# Patient Record
Sex: MALE | Race: WHITE | NOT HISPANIC OR LATINO | URBAN - METROPOLITAN AREA
[De-identification: names, ages, dates, MRNs, and addresses within clinical notes are randomized per-mention and may not be internally consistent; named-entity substitution may affect disease eponyms.]

---

## 2018-05-17 ENCOUNTER — EMERGENCY (EMERGENCY)
Facility: HOSPITAL | Age: 43
LOS: 0 days | Discharge: HOME | End: 2018-05-17
Attending: EMERGENCY MEDICINE | Admitting: EMERGENCY MEDICINE
Payer: COMMERCIAL

## 2018-05-17 VITALS
SYSTOLIC BLOOD PRESSURE: 148 MMHG | WEIGHT: 195.11 LBS | DIASTOLIC BLOOD PRESSURE: 95 MMHG | RESPIRATION RATE: 18 BRPM | TEMPERATURE: 98 F | OXYGEN SATURATION: 97 % | HEART RATE: 99 BPM

## 2018-05-17 VITALS
SYSTOLIC BLOOD PRESSURE: 125 MMHG | OXYGEN SATURATION: 97 % | TEMPERATURE: 98 F | HEART RATE: 89 BPM | DIASTOLIC BLOOD PRESSURE: 72 MMHG | RESPIRATION RATE: 18 BRPM

## 2018-05-17 LAB
ALBUMIN SERPL ELPH-MCNC: 3.9 G/DL — SIGNIFICANT CHANGE UP (ref 3.3–5)
ALP SERPL-CCNC: 97 U/L — SIGNIFICANT CHANGE UP (ref 40–120)
ALT FLD-CCNC: 44 U/L — SIGNIFICANT CHANGE UP (ref 12–78)
ANION GAP SERPL CALC-SCNC: 7 MMOL/L — SIGNIFICANT CHANGE UP (ref 5–17)
AST SERPL-CCNC: 31 U/L — SIGNIFICANT CHANGE UP (ref 15–37)
BASOPHILS # BLD AUTO: 0.03 K/UL — SIGNIFICANT CHANGE UP (ref 0–0.2)
BASOPHILS NFR BLD AUTO: 0.4 % — SIGNIFICANT CHANGE UP (ref 0–2)
BILIRUB SERPL-MCNC: 0.3 MG/DL — SIGNIFICANT CHANGE UP (ref 0.2–1.2)
BUN SERPL-MCNC: 14 MG/DL — SIGNIFICANT CHANGE UP (ref 7–23)
CALCIUM SERPL-MCNC: 8.6 MG/DL — SIGNIFICANT CHANGE UP (ref 8.5–10.1)
CHLORIDE SERPL-SCNC: 108 MMOL/L — SIGNIFICANT CHANGE UP (ref 96–108)
CO2 SERPL-SCNC: 27 MMOL/L — SIGNIFICANT CHANGE UP (ref 22–31)
CREAT SERPL-MCNC: 0.92 MG/DL — SIGNIFICANT CHANGE UP (ref 0.5–1.3)
EOSINOPHIL # BLD AUTO: 0.16 K/UL — SIGNIFICANT CHANGE UP (ref 0–0.5)
EOSINOPHIL NFR BLD AUTO: 2.1 % — SIGNIFICANT CHANGE UP (ref 0–6)
GLUCOSE SERPL-MCNC: 122 MG/DL — HIGH (ref 70–99)
HCT VFR BLD CALC: 41.8 % — SIGNIFICANT CHANGE UP (ref 39–50)
HGB BLD-MCNC: 15 G/DL — SIGNIFICANT CHANGE UP (ref 13–17)
IMM GRANULOCYTES NFR BLD AUTO: 0.3 % — SIGNIFICANT CHANGE UP (ref 0–1.5)
LYMPHOCYTES # BLD AUTO: 1.44 K/UL — SIGNIFICANT CHANGE UP (ref 1–3.3)
LYMPHOCYTES # BLD AUTO: 19.1 % — SIGNIFICANT CHANGE UP (ref 13–44)
MAGNESIUM SERPL-MCNC: 2 MG/DL — SIGNIFICANT CHANGE UP (ref 1.6–2.6)
MCHC RBC-ENTMCNC: 31 PG — SIGNIFICANT CHANGE UP (ref 27–34)
MCHC RBC-ENTMCNC: 35.9 GM/DL — SIGNIFICANT CHANGE UP (ref 32–36)
MCV RBC AUTO: 86.4 FL — SIGNIFICANT CHANGE UP (ref 80–100)
MONOCYTES # BLD AUTO: 0.52 K/UL — SIGNIFICANT CHANGE UP (ref 0–0.9)
MONOCYTES NFR BLD AUTO: 6.9 % — SIGNIFICANT CHANGE UP (ref 2–14)
NEUTROPHILS # BLD AUTO: 5.38 K/UL — SIGNIFICANT CHANGE UP (ref 1.8–7.4)
NEUTROPHILS NFR BLD AUTO: 71.2 % — SIGNIFICANT CHANGE UP (ref 43–77)
NRBC # BLD: 0 /100 WBCS — SIGNIFICANT CHANGE UP (ref 0–0)
PHOSPHATE SERPL-MCNC: 2.1 MG/DL — LOW (ref 2.5–4.5)
PLATELET # BLD AUTO: 230 K/UL — SIGNIFICANT CHANGE UP (ref 150–400)
POTASSIUM SERPL-MCNC: 4 MMOL/L — SIGNIFICANT CHANGE UP (ref 3.5–5.3)
POTASSIUM SERPL-SCNC: 4 MMOL/L — SIGNIFICANT CHANGE UP (ref 3.5–5.3)
PROT SERPL-MCNC: 7.6 GM/DL — SIGNIFICANT CHANGE UP (ref 6–8.3)
RBC # BLD: 4.84 M/UL — SIGNIFICANT CHANGE UP (ref 4.2–5.8)
RBC # FLD: 11.8 % — SIGNIFICANT CHANGE UP (ref 10.3–14.5)
SODIUM SERPL-SCNC: 142 MMOL/L — SIGNIFICANT CHANGE UP (ref 135–145)
TROPONIN I SERPL-MCNC: <0.015 NG/ML — SIGNIFICANT CHANGE UP (ref 0.01–0.04)
WBC # BLD: 7.55 K/UL — SIGNIFICANT CHANGE UP (ref 3.8–10.5)
WBC # FLD AUTO: 7.55 K/UL — SIGNIFICANT CHANGE UP (ref 3.8–10.5)

## 2018-05-17 PROCEDURE — 93010 ELECTROCARDIOGRAM REPORT: CPT

## 2018-05-17 PROCEDURE — 99284 EMERGENCY DEPT VISIT MOD MDM: CPT

## 2018-05-17 PROCEDURE — 70450 CT HEAD/BRAIN W/O DYE: CPT | Mod: 26

## 2018-05-17 RX ORDER — SODIUM,POTASSIUM PHOSPHATES 278-250MG
1 POWDER IN PACKET (EA) ORAL ONCE
Qty: 0 | Refills: 0 | Status: COMPLETED | OUTPATIENT
Start: 2018-05-17 | End: 2018-05-17

## 2018-05-17 RX ADMIN — Medication 1 PACKET(S): at 21:38

## 2018-05-17 NOTE — ED PROVIDER NOTE - OBJECTIVE STATEMENT
42 yo M no significant PMHx presents with CC paresthesias.  Symptoms began yesterday.  C/o migrating, transient paresthesias of the left jaw, left arm, sometimes legs.  Did have some SOB, palpitations, feeling of anxiety as well today, which brought him to the ED.  Denies headaches, slurred speech, weakness, or any other symptoms.  Denies previous occurrence.  No other concerns.

## 2018-05-17 NOTE — ED ADULT TRIAGE NOTE - CHIEF COMPLAINT QUOTE
pt presents to ED due to left sided numbness and tingling x 3 days , pt states today he had difficulty with speech and numbness down left sided jaw and left arm now resolved NO CODE STROKE AS PER MD ROBERTS

## 2018-05-17 NOTE — ED PROVIDER NOTE - MEDICAL DECISION MAKING DETAILS
No neuro deficits on exam.  Symptoms not consistent with stroke.  Pt appears well, WNL.  CT head WNL.  Labs WNl with exception of low phos.  Asymptomatic on reeval.  Given phosphorus replacement in ED.  Okay for d/c home, f/u with PCP.

## 2018-05-17 NOTE — ED ADULT NURSE REASSESSMENT NOTE - NS ED NURSE REASSESS COMMENT FT1
Care of pt received from Sosa Matt RN, pt ambulated self to bathroom without difficulty. Pt taken to and returned from CT. Awaiting  radiology and laboratory results at this time. Will continue to monitor.

## 2018-05-18 DIAGNOSIS — E83.39 OTHER DISORDERS OF PHOSPHORUS METABOLISM: ICD-10-CM

## 2018-05-18 DIAGNOSIS — R20.2 PARESTHESIA OF SKIN: ICD-10-CM

## 2018-05-18 DIAGNOSIS — R20.0 ANESTHESIA OF SKIN: ICD-10-CM

## 2019-11-27 PROBLEM — Z00.00 ENCOUNTER FOR PREVENTIVE HEALTH EXAMINATION: Status: ACTIVE | Noted: 2019-11-27

## 2019-12-04 ENCOUNTER — APPOINTMENT (OUTPATIENT)
Dept: GASTROENTEROLOGY | Facility: CLINIC | Age: 44
End: 2019-12-04
Payer: MEDICAID

## 2019-12-04 VITALS
SYSTOLIC BLOOD PRESSURE: 130 MMHG | WEIGHT: 195 LBS | HEART RATE: 86 BPM | HEIGHT: 71 IN | BODY MASS INDEX: 27.3 KG/M2 | DIASTOLIC BLOOD PRESSURE: 79 MMHG

## 2019-12-04 DIAGNOSIS — F41.9 ANXIETY DISORDER, UNSPECIFIED: ICD-10-CM

## 2019-12-04 DIAGNOSIS — Z78.9 OTHER SPECIFIED HEALTH STATUS: ICD-10-CM

## 2019-12-04 DIAGNOSIS — J34.9 UNSPECIFIED DISORDER OF NOSE AND NASAL SINUSES: ICD-10-CM

## 2019-12-04 DIAGNOSIS — R19.7 DIARRHEA, UNSPECIFIED: ICD-10-CM

## 2019-12-04 DIAGNOSIS — Z80.9 FAMILY HISTORY OF MALIGNANT NEOPLASM, UNSPECIFIED: ICD-10-CM

## 2019-12-04 DIAGNOSIS — R14.3 FLATULENCE: ICD-10-CM

## 2019-12-04 DIAGNOSIS — R06.2 WHEEZING: ICD-10-CM

## 2019-12-04 DIAGNOSIS — F43.9 REACTION TO SEVERE STRESS, UNSPECIFIED: ICD-10-CM

## 2019-12-04 DIAGNOSIS — R05 COUGH: ICD-10-CM

## 2019-12-04 DIAGNOSIS — Z87.898 PERSONAL HISTORY OF OTHER SPECIFIED CONDITIONS: ICD-10-CM

## 2019-12-04 DIAGNOSIS — R10.84 GENERALIZED ABDOMINAL PAIN: ICD-10-CM

## 2019-12-04 PROCEDURE — 99204 OFFICE O/P NEW MOD 45 MIN: CPT

## 2019-12-05 PROBLEM — Z80.9 FAMILY HISTORY OF MALIGNANT NEOPLASM: Status: ACTIVE | Noted: 2019-12-04

## 2019-12-05 PROBLEM — R05 COUGH: Status: ACTIVE | Noted: 2019-12-04

## 2019-12-05 PROBLEM — R10.84 GENERALIZED ABDOMINAL PAIN: Status: ACTIVE | Noted: 2019-12-04

## 2019-12-05 PROBLEM — R14.3 EXCESSIVE GAS: Status: ACTIVE | Noted: 2019-12-04

## 2019-12-05 PROBLEM — Z87.898 HISTORY OF SHORTNESS OF BREATH: Status: ACTIVE | Noted: 2019-12-04

## 2019-12-05 PROBLEM — Z78.9 CAFFEINE USE: Status: ACTIVE | Noted: 2019-12-04

## 2019-12-05 PROBLEM — J34.9 SINUS PROBLEM: Status: ACTIVE | Noted: 2019-12-04

## 2019-12-05 PROBLEM — F43.9 STRESS: Status: ACTIVE | Noted: 2019-12-04

## 2019-12-05 PROBLEM — F41.9 ANXIETY: Status: ACTIVE | Noted: 2019-12-04

## 2019-12-05 PROBLEM — R19.7 DIARRHEA, UNSPECIFIED TYPE: Status: ACTIVE | Noted: 2019-12-04

## 2019-12-05 PROBLEM — R06.2 WHEEZING: Status: ACTIVE | Noted: 2019-12-04

## 2019-12-05 PROBLEM — Z78.9 SOCIAL ALCOHOL USE: Status: ACTIVE | Noted: 2019-12-04

## 2019-12-05 NOTE — PHYSICAL EXAM
[General Appearance - Alert] : alert [General Appearance - In No Acute Distress] : in no acute distress [PERRL With Normal Accommodation] : pupils were equal in size, round, and reactive to light [Sclera] : the sclera and conjunctiva were normal [Extraocular Movements] : extraocular movements were intact [Outer Ear] : the ears and nose were normal in appearance [Oropharynx] : the oropharynx was normal [Neck Appearance] : the appearance of the neck was normal [Neck Cervical Mass (___cm)] : no neck mass was observed [Jugular Venous Distention Increased] : there was no jugular-venous distention [Thyroid Diffuse Enlargement] : the thyroid was not enlarged [Thyroid Nodule] : there were no palpable thyroid nodules [Auscultation Breath Sounds / Voice Sounds] : lungs were clear to auscultation bilaterally [Heart Rate And Rhythm] : heart rate was normal and rhythm regular [Heart Sounds] : normal S1 and S2 [Heart Sounds Gallop] : no gallops [Heart Sounds Pericardial Friction Rub] : no pericardial rub [Murmurs] : no murmurs [Abdomen Soft] : soft [Bowel Sounds] : normal bowel sounds [Abdomen Tenderness] : non-tender [Abdomen Mass (___ Cm)] : no abdominal mass palpated [Cervical Lymph Nodes Enlarged Posterior Bilaterally] : posterior cervical [Cervical Lymph Nodes Enlarged Anterior Bilaterally] : anterior cervical [Supraclavicular Lymph Nodes Enlarged Bilaterally] : supraclavicular [No CVA Tenderness] : no ~M costovertebral angle tenderness [No Spinal Tenderness] : no spinal tenderness [Nail Clubbing] : no clubbing  or cyanosis of the fingernails [Abnormal Walk] : normal gait [Musculoskeletal - Swelling] : no joint swelling seen [Motor Tone] : muscle strength and tone were normal [Skin Color & Pigmentation] : normal skin color and pigmentation [] : no rash [Skin Turgor] : normal skin turgor [Oriented To Time, Place, And Person] : oriented to person, place, and time [No Focal Deficits] : no focal deficits [Impaired Insight] : insight and judgment were intact [Affect] : the affect was normal

## 2019-12-24 NOTE — HISTORY OF PRESENT ILLNESS
[de-identified] : The patient arrived for consultation visit. Patient has history of severe reflux in the past. He had an EGD performed about 5 years ago. He was on omeprazole and then by changing his diet his reflux is under control. He is not taking any medications for that. He also underwent a colonoscopy at that time which was unremarkable. Lately he has been complaining of change in bowel habits and has to pass bowel movements more frequently. He does not notice any bleeding. His father passed away due to undiagnosed metastatic cancer. There was a concern whether he had colon cancer. The patient is eating heavily and has intentional weight loss. No recent labs.

## 2019-12-24 NOTE — ASSESSMENT
[FreeTextEntry1] : I am recommending to obtain the records after last EGD and colonoscopy. I'm recommending to drink more water and start fiber supplementation. Due to concern of Metastatic colon cancer in his father, we can consider performing colonoscopy. The bowel preparation was discussed at length. Risks (including bleeding, pain, perforation, incomplete examination, splenic laceration, adverse reactions to medications, aspiration and death), benefits and alternatives were discussed. Patient is agreeable for the colonoscopy. The patient is medically optimized for the procedure. We will schedule the patient for the procedure. Bowel preparation was sent to the pharmacy.\par \par Hima Bazzi MD\par Gastroenterology \par \par

## 2020-01-03 ENCOUNTER — APPOINTMENT (OUTPATIENT)
Dept: GASTROENTEROLOGY | Facility: GI CENTER | Age: 45
End: 2020-01-03
Payer: MEDICAID

## 2020-01-03 ENCOUNTER — OUTPATIENT (OUTPATIENT)
Dept: OUTPATIENT SERVICES | Facility: HOSPITAL | Age: 45
LOS: 1 days | End: 2020-01-03
Payer: COMMERCIAL

## 2020-01-03 ENCOUNTER — RESULT REVIEW (OUTPATIENT)
Age: 45
End: 2020-01-03

## 2020-01-03 DIAGNOSIS — R10.9 UNSPECIFIED ABDOMINAL PAIN: ICD-10-CM

## 2020-01-03 DIAGNOSIS — R14.0 ABDOMINAL DISTENSION (GASEOUS): ICD-10-CM

## 2020-01-03 PROCEDURE — 88305 TISSUE EXAM BY PATHOLOGIST: CPT | Mod: 26

## 2020-01-03 PROCEDURE — 45380 COLONOSCOPY AND BIOPSY: CPT

## 2020-01-03 PROCEDURE — 88305 TISSUE EXAM BY PATHOLOGIST: CPT

## 2020-01-03 RX ORDER — POLYETHYLENE GLYOCOL 3350, SODIUM CHLORIDE, SODIUM BICARBONATE AND POTASSIUM CHLORIDE 420; 11.2; 5.72; 1.48 G/4L; G/4L; G/4L; G/4L
420 POWDER, FOR SOLUTION NASOGASTRIC; ORAL
Qty: 1 | Refills: 0 | Status: COMPLETED | COMMUNITY
Start: 2019-12-04 | End: 2020-01-03

## 2020-01-03 NOTE — ASSESSMENT
[FreeTextEntry1] : IMPRESSION:\par Diminutive colon polyp\par Internal hemorrhoids\par \par RECOMMENDATIONS:\par Repeat colonoscopy in 5 years

## 2020-01-03 NOTE — PHYSICAL EXAM
[General Appearance - Alert] : alert [General Appearance - In No Acute Distress] : in no acute distress [Sclera] : the sclera and conjunctiva were normal [PERRL With Normal Accommodation] : pupils were equal in size, round, and reactive to light [Outer Ear] : the ears and nose were normal in appearance [Oropharynx] : the oropharynx was normal [Extraocular Movements] : extraocular movements were intact [Neck Cervical Mass (___cm)] : no neck mass was observed [Neck Appearance] : the appearance of the neck was normal [Jugular Venous Distention Increased] : there was no jugular-venous distention [Thyroid Diffuse Enlargement] : the thyroid was not enlarged [Thyroid Nodule] : there were no palpable thyroid nodules [Auscultation Breath Sounds / Voice Sounds] : lungs were clear to auscultation bilaterally [Heart Sounds] : normal S1 and S2 [Heart Sounds Gallop] : no gallops [Heart Rate And Rhythm] : heart rate was normal and rhythm regular [Murmurs] : no murmurs [Heart Sounds Pericardial Friction Rub] : no pericardial rub [Bowel Sounds] : normal bowel sounds [Abdomen Soft] : soft [Abdomen Tenderness] : non-tender [Abdomen Mass (___ Cm)] : no abdominal mass palpated [Cervical Lymph Nodes Enlarged Posterior Bilaterally] : posterior cervical [Cervical Lymph Nodes Enlarged Anterior Bilaterally] : anterior cervical [No CVA Tenderness] : no ~M costovertebral angle tenderness [Supraclavicular Lymph Nodes Enlarged Bilaterally] : supraclavicular [No Spinal Tenderness] : no spinal tenderness [Nail Clubbing] : no clubbing  or cyanosis of the fingernails [Abnormal Walk] : normal gait [Musculoskeletal - Swelling] : no joint swelling seen [Skin Color & Pigmentation] : normal skin color and pigmentation [Motor Tone] : muscle strength and tone were normal [] : no rash [No Focal Deficits] : no focal deficits [Skin Turgor] : normal skin turgor [Oriented To Time, Place, And Person] : oriented to person, place, and time [Impaired Insight] : insight and judgment were intact [Affect] : the affect was normal

## 2020-01-03 NOTE — PROCEDURE
[With Biopsy] : with biopsy [With Polypectomy] : polypectomy [Allergies Reviewed] : allergies reviewed. [Procedure Explained] : The procedure was explained [Abdominal Pain] : abdominal pain [Benefits] : benefits [Risks] : Risks [Alternatives] : alternatives [Consent Obtained] : written consent was obtained prior to the procedure and is detailed in the patient's record [Bowel Prep Kit] : the patient took the appropriate bowel preparation kit as directed [Patient] : the patient [Approved Diet Followed] : the patient avoided solid foods and adhered to the approved diet list for 24 hours prior to the procedure [Automated Blood Pressure Cuff] : automated blood pressure cuff [Cardiac Monitor] : cardiac monitor [Pulse Oximeter] : pulse oximeter [Propofol ___ mg IV] : Propofol [unfilled] ~Umg intravenously [2] : 2 [Sedation Clearance] : the patient was cleared for moderate sedation [Time started: ___] : Start Time:  [unfilled] [Withdrawal Time: ___] : Withdrawal Time:  [unfilled] [Prep Qualtiy: ___] : Prep Quality:  [unfilled] [Time Completed: ___] : Completion Time:  [unfilled] [Left Lateral Decubitus] : The patient was positioned in the left lateral decubitus position [Performed By: ___] : Performed by:  KRIS [Normal Prostate] : a normal prostate [Cecum (Landmarks)] : and guided to the cecum which was identified by the anatomic landmarks of the appendiceal orifice and ileocecal valve [Terminal Ileum via Ileocecal Valve] : and the terminal ileum was examined by entering the ileocecal valve [Single Pass Needed] : after a single pass [No Difficulty] : without difficulty [Insufflated] : insufflated [Retroflex View] : a retroflex view of the rectum was performed [Polyps] : polyps [Biopsy] : biopsy [Hemorrhoids] : hemorrhoids [Normal] : Normal [Sent to Pathology] : was sent to pathology for analysis [No Complications] : There were no complications [Vital Signs Stable] : the vital signs were stable [Tolerated Well] : the patient tolerated the procedure well [Abnormal Rectum] : a normal rectum [External Hemorrhoids] : no external hemorrhoids [Patient Rotated Into Alternating Positions] : the patient was not rotated [FreeTextEntry2] : [id number] [de-identified] : Normal terminal ileum [de-identified] : 3 mm polyp s/p cold biopsy [de-identified] : Transverse colon polyp

## 2020-01-03 NOTE — PHYSICAL EXAM
[General Appearance - In No Acute Distress] : in no acute distress [General Appearance - Alert] : alert [Sclera] : the sclera and conjunctiva were normal [PERRL With Normal Accommodation] : pupils were equal in size, round, and reactive to light [Extraocular Movements] : extraocular movements were intact [Oropharynx] : the oropharynx was normal [Outer Ear] : the ears and nose were normal in appearance [Neck Appearance] : the appearance of the neck was normal [Jugular Venous Distention Increased] : there was no jugular-venous distention [Neck Cervical Mass (___cm)] : no neck mass was observed [Thyroid Diffuse Enlargement] : the thyroid was not enlarged [Thyroid Nodule] : there were no palpable thyroid nodules [Auscultation Breath Sounds / Voice Sounds] : lungs were clear to auscultation bilaterally [Heart Rate And Rhythm] : heart rate was normal and rhythm regular [Heart Sounds] : normal S1 and S2 [Heart Sounds Gallop] : no gallops [Murmurs] : no murmurs [Heart Sounds Pericardial Friction Rub] : no pericardial rub [Bowel Sounds] : normal bowel sounds [Abdomen Tenderness] : non-tender [Abdomen Soft] : soft [Abdomen Mass (___ Cm)] : no abdominal mass palpated [Cervical Lymph Nodes Enlarged Posterior Bilaterally] : posterior cervical [Cervical Lymph Nodes Enlarged Anterior Bilaterally] : anterior cervical [No CVA Tenderness] : no ~M costovertebral angle tenderness [Supraclavicular Lymph Nodes Enlarged Bilaterally] : supraclavicular [No Spinal Tenderness] : no spinal tenderness [Abnormal Walk] : normal gait [Nail Clubbing] : no clubbing  or cyanosis of the fingernails [Musculoskeletal - Swelling] : no joint swelling seen [Skin Color & Pigmentation] : normal skin color and pigmentation [Motor Tone] : muscle strength and tone were normal [] : no rash [Skin Turgor] : normal skin turgor [No Focal Deficits] : no focal deficits [Impaired Insight] : insight and judgment were intact [Oriented To Time, Place, And Person] : oriented to person, place, and time [Affect] : the affect was normal

## 2020-01-03 NOTE — PROCEDURE
[With Biopsy] : with biopsy [With Polypectomy] : polypectomy [Allergies Reviewed] : allergies reviewed. [Abdominal Pain] : abdominal pain [Procedure Explained] : The procedure was explained [Benefits] : benefits [Risks] : Risks [Consent Obtained] : written consent was obtained prior to the procedure and is detailed in the patient's record [Alternatives] : alternatives [Patient] : the patient [Bowel Prep Kit] : the patient took the appropriate bowel preparation kit as directed [Automated Blood Pressure Cuff] : automated blood pressure cuff [Approved Diet Followed] : the patient avoided solid foods and adhered to the approved diet list for 24 hours prior to the procedure [Pulse Oximeter] : pulse oximeter [Propofol ___ mg IV] : Propofol [unfilled] ~Umg intravenously [Cardiac Monitor] : cardiac monitor [2] : 2 [Time started: ___] : Start Time:  [unfilled] [Sedation Clearance] : the patient was cleared for moderate sedation [Withdrawal Time: ___] : Withdrawal Time:  [unfilled] [Prep Qualtiy: ___] : Prep Quality:  [unfilled] [Left Lateral Decubitus] : The patient was positioned in the left lateral decubitus position [Performed By: ___] : Performed by:  KRIS [Time Completed: ___] : Completion Time:  [unfilled] [Normal Prostate] : a normal prostate [Terminal Ileum via Ileocecal Valve] : and the terminal ileum was examined by entering the ileocecal valve [Cecum (Landmarks)] : and guided to the cecum which was identified by the anatomic landmarks of the appendiceal orifice and ileocecal valve [Insufflated] : insufflated [No Difficulty] : without difficulty [Single Pass Needed] : after a single pass [Retroflex View] : a retroflex view of the rectum was performed [Biopsy] : biopsy [Polyps] : polyps [Sent to Pathology] : was sent to pathology for analysis [Normal] : Normal [Hemorrhoids] : hemorrhoids [Vital Signs Stable] : the vital signs were stable [Tolerated Well] : the patient tolerated the procedure well [No Complications] : There were no complications [Abnormal Rectum] : a normal rectum [External Hemorrhoids] : no external hemorrhoids [Patient Rotated Into Alternating Positions] : the patient was not rotated [FreeTextEntry2] : [id number] [de-identified] : Normal terminal ileum [de-identified] : 3 mm polyp s/p cold biopsy [de-identified] : Transverse colon polyp

## 2020-01-08 ENCOUNTER — RESULT REVIEW (OUTPATIENT)
Age: 45
End: 2020-01-08

## 2020-01-08 DIAGNOSIS — K63.5 POLYP OF COLON: ICD-10-CM

## 2020-01-08 LAB — SURGICAL PATHOLOGY STUDY: SIGNIFICANT CHANGE UP

## 2020-01-10 ENCOUNTER — OTHER (OUTPATIENT)
Age: 45
End: 2020-01-10

## 2020-07-30 ENCOUNTER — APPOINTMENT (OUTPATIENT)
Dept: GASTROENTEROLOGY | Facility: CLINIC | Age: 45
End: 2020-07-30
Payer: MEDICAID

## 2020-07-30 DIAGNOSIS — R19.4 CHANGE IN BOWEL HABIT: ICD-10-CM

## 2020-07-30 DIAGNOSIS — K58.0 IRRITABLE BOWEL SYNDROME WITH DIARRHEA: ICD-10-CM

## 2020-07-30 DIAGNOSIS — R14.0 ABDOMINAL DISTENSION (GASEOUS): ICD-10-CM

## 2020-07-30 PROCEDURE — 99202 OFFICE O/P NEW SF 15 MIN: CPT | Mod: 95

## 2020-07-30 NOTE — HISTORY OF PRESENT ILLNESS
[Home] : at home, [unfilled] , at the time of the visit. [Medical Office: ([facility])___] : at the medical office located in  [Verbal consent obtained from patient] : the patient, [unfilled] [de-identified] : 46yo male with several month history of altered bowel function\par He has developed increased stool frequency of softer, not liquid stool, up to 5-7 times per day\par it started months ago without clear inciting factor\par Negative colonoscopy. He has avoided milk products and gluten for about 1 month and been on a probiotic with some improvement initially but now again with frequent softer BM\par He can have urgency and lower abdominal cramping\par I reviewed all prior records - hx usama in 2009

## 2020-07-30 NOTE — ASSESSMENT
[FreeTextEntry1] : 44yo male with likely IBS diarrhea\par will try xifaxan empirically\par if no help, consider further evaluation and antispasmodics\par \par no help with fiber gummies

## 2020-12-28 RX ORDER — METHENAMINE MANDELATE 1 G
1 TABLET ORAL
Qty: 0 | Refills: 0 | DISCHARGE
Start: 2020-12-28 | End: 2021-01-03

## 2021-01-15 ENCOUNTER — APPOINTMENT (OUTPATIENT)
Dept: UROLOGY | Facility: CLINIC | Age: 46
End: 2021-01-15
Payer: MEDICAID

## 2021-01-15 VITALS
SYSTOLIC BLOOD PRESSURE: 118 MMHG | HEART RATE: 105 BPM | BODY MASS INDEX: 25.48 KG/M2 | WEIGHT: 182 LBS | OXYGEN SATURATION: 97 % | HEIGHT: 71 IN | DIASTOLIC BLOOD PRESSURE: 76 MMHG

## 2021-01-15 DIAGNOSIS — N41.0 ACUTE PROSTATITIS: ICD-10-CM

## 2021-01-15 PROCEDURE — 99204 OFFICE O/P NEW MOD 45 MIN: CPT

## 2021-01-15 PROCEDURE — 99072 ADDL SUPL MATRL&STAF TM PHE: CPT

## 2021-01-21 ENCOUNTER — INPATIENT (INPATIENT)
Facility: HOSPITAL | Age: 46
LOS: 4 days | Discharge: HOME | DRG: 720 | End: 2021-01-26
Attending: INTERNAL MEDICINE | Admitting: INTERNAL MEDICINE
Payer: MEDICAID

## 2021-01-21 VITALS
HEART RATE: 100 BPM | TEMPERATURE: 100 F | SYSTOLIC BLOOD PRESSURE: 112 MMHG | DIASTOLIC BLOOD PRESSURE: 74 MMHG | OXYGEN SATURATION: 95 % | RESPIRATION RATE: 18 BRPM

## 2021-01-21 DIAGNOSIS — U07.1 COVID-19: ICD-10-CM

## 2021-01-21 LAB
ANION GAP SERPL CALC-SCNC: 4 MMOL/L — LOW (ref 5–17)
APPEARANCE UR: CLEAR — SIGNIFICANT CHANGE UP
BASOPHILS # BLD AUTO: 0 K/UL — SIGNIFICANT CHANGE UP (ref 0–0.2)
BASOPHILS NFR BLD AUTO: 0 % — SIGNIFICANT CHANGE UP (ref 0–2)
BILIRUB UR-MCNC: NEGATIVE — SIGNIFICANT CHANGE UP
BUN SERPL-MCNC: 9 MG/DL — SIGNIFICANT CHANGE UP (ref 7–23)
CALCIUM SERPL-MCNC: 7.4 MG/DL — LOW (ref 8.5–10.1)
CHLORIDE SERPL-SCNC: 105 MMOL/L — SIGNIFICANT CHANGE UP (ref 96–108)
CO2 SERPL-SCNC: 28 MMOL/L — SIGNIFICANT CHANGE UP (ref 22–31)
COLOR SPEC: YELLOW — SIGNIFICANT CHANGE UP
CREAT SERPL-MCNC: 0.86 MG/DL — SIGNIFICANT CHANGE UP (ref 0.5–1.3)
DIFF PNL FLD: ABNORMAL
EOSINOPHIL # BLD AUTO: 0 K/UL — SIGNIFICANT CHANGE UP (ref 0–0.5)
EOSINOPHIL NFR BLD AUTO: 0 % — SIGNIFICANT CHANGE UP (ref 0–6)
GLUCOSE SERPL-MCNC: 97 MG/DL — SIGNIFICANT CHANGE UP (ref 70–99)
GLUCOSE UR QL: NEGATIVE MG/DL — SIGNIFICANT CHANGE UP
HCT VFR BLD CALC: 37.5 % — LOW (ref 39–50)
HGB BLD-MCNC: 13 G/DL — SIGNIFICANT CHANGE UP (ref 13–17)
KETONES UR-MCNC: ABNORMAL
LACTATE SERPL-SCNC: 0.8 MMOL/L — SIGNIFICANT CHANGE UP (ref 0.7–2)
LEUKOCYTE ESTERASE UR-ACNC: ABNORMAL
LYMPHOCYTES # BLD AUTO: 0.47 K/UL — LOW (ref 1–3.3)
LYMPHOCYTES # BLD AUTO: 24 % — SIGNIFICANT CHANGE UP (ref 13–44)
MCHC RBC-ENTMCNC: 31.2 PG — SIGNIFICANT CHANGE UP (ref 27–34)
MCHC RBC-ENTMCNC: 34.7 GM/DL — SIGNIFICANT CHANGE UP (ref 32–36)
MCV RBC AUTO: 89.9 FL — SIGNIFICANT CHANGE UP (ref 80–100)
MONOCYTES # BLD AUTO: 0.14 K/UL — SIGNIFICANT CHANGE UP (ref 0–0.9)
MONOCYTES NFR BLD AUTO: 7 % — SIGNIFICANT CHANGE UP (ref 2–14)
NEUTROPHILS # BLD AUTO: 1.33 K/UL — LOW (ref 1.8–7.4)
NEUTROPHILS NFR BLD AUTO: 62 % — SIGNIFICANT CHANGE UP (ref 43–77)
NITRITE UR-MCNC: NEGATIVE — SIGNIFICANT CHANGE UP
NRBC # BLD: SIGNIFICANT CHANGE UP /100 WBCS (ref 0–0)
PH UR: 6 — SIGNIFICANT CHANGE UP (ref 5–8)
PLATELET # BLD AUTO: 111 K/UL — LOW (ref 150–400)
POTASSIUM SERPL-MCNC: 3.9 MMOL/L — SIGNIFICANT CHANGE UP (ref 3.5–5.3)
POTASSIUM SERPL-SCNC: 3.9 MMOL/L — SIGNIFICANT CHANGE UP (ref 3.5–5.3)
PROT UR-MCNC: 30 MG/DL
RBC # BLD: 4.17 M/UL — LOW (ref 4.2–5.8)
RBC # FLD: 11.2 % — SIGNIFICANT CHANGE UP (ref 10.3–14.5)
SODIUM SERPL-SCNC: 137 MMOL/L — SIGNIFICANT CHANGE UP (ref 135–145)
SP GR SPEC: 1.01 — SIGNIFICANT CHANGE UP (ref 1.01–1.02)
UROBILINOGEN FLD QL: NEGATIVE MG/DL — SIGNIFICANT CHANGE UP
WBC # BLD: 1.96 K/UL — LOW (ref 3.8–10.5)
WBC # FLD AUTO: 1.96 K/UL — LOW (ref 3.8–10.5)

## 2021-01-21 PROCEDURE — C9399: CPT

## 2021-01-21 PROCEDURE — 86140 C-REACTIVE PROTEIN: CPT

## 2021-01-21 PROCEDURE — 94618 PULMONARY STRESS TESTING: CPT

## 2021-01-21 PROCEDURE — 99221 1ST HOSP IP/OBS SF/LOW 40: CPT

## 2021-01-21 PROCEDURE — 93010 ELECTROCARDIOGRAM REPORT: CPT

## 2021-01-21 PROCEDURE — 85027 COMPLETE CBC AUTOMATED: CPT

## 2021-01-21 PROCEDURE — 80053 COMPREHEN METABOLIC PANEL: CPT

## 2021-01-21 PROCEDURE — 85379 FIBRIN DEGRADATION QUANT: CPT

## 2021-01-21 PROCEDURE — 80048 BASIC METABOLIC PNL TOTAL CA: CPT

## 2021-01-21 PROCEDURE — 83605 ASSAY OF LACTIC ACID: CPT

## 2021-01-21 PROCEDURE — 87040 BLOOD CULTURE FOR BACTERIA: CPT

## 2021-01-21 PROCEDURE — 82728 ASSAY OF FERRITIN: CPT

## 2021-01-21 PROCEDURE — 80076 HEPATIC FUNCTION PANEL: CPT

## 2021-01-21 PROCEDURE — 94640 AIRWAY INHALATION TREATMENT: CPT

## 2021-01-21 PROCEDURE — 71045 X-RAY EXAM CHEST 1 VIEW: CPT | Mod: 26

## 2021-01-21 PROCEDURE — 82565 ASSAY OF CREATININE: CPT

## 2021-01-21 PROCEDURE — 36415 COLL VENOUS BLD VENIPUNCTURE: CPT

## 2021-01-21 RX ORDER — VANCOMYCIN HCL 1 G
1250 VIAL (EA) INTRAVENOUS ONCE
Refills: 0 | Status: COMPLETED | OUTPATIENT
Start: 2021-01-21 | End: 2021-01-21

## 2021-01-21 RX ORDER — IBUPROFEN 200 MG
600 TABLET ORAL ONCE
Refills: 0 | Status: COMPLETED | OUTPATIENT
Start: 2021-01-21 | End: 2021-01-21

## 2021-01-21 RX ORDER — ENOXAPARIN SODIUM 100 MG/ML
40 INJECTION SUBCUTANEOUS DAILY
Refills: 0 | Status: DISCONTINUED | OUTPATIENT
Start: 2021-01-21 | End: 2021-01-26

## 2021-01-21 RX ORDER — SODIUM CHLORIDE 9 MG/ML
2000 INJECTION INTRAMUSCULAR; INTRAVENOUS; SUBCUTANEOUS ONCE
Refills: 0 | Status: COMPLETED | OUTPATIENT
Start: 2021-01-21 | End: 2021-01-21

## 2021-01-21 RX ORDER — KETOROLAC TROMETHAMINE 30 MG/ML
30 SYRINGE (ML) INJECTION ONCE
Refills: 0 | Status: DISCONTINUED | OUTPATIENT
Start: 2021-01-21 | End: 2021-01-21

## 2021-01-21 RX ORDER — ACETAMINOPHEN 500 MG
650 TABLET ORAL EVERY 6 HOURS
Refills: 0 | Status: DISCONTINUED | OUTPATIENT
Start: 2021-01-21 | End: 2021-01-26

## 2021-01-21 RX ORDER — SODIUM CHLORIDE 9 MG/ML
1000 INJECTION INTRAMUSCULAR; INTRAVENOUS; SUBCUTANEOUS
Refills: 0 | Status: DISCONTINUED | OUTPATIENT
Start: 2021-01-21 | End: 2021-01-23

## 2021-01-21 RX ORDER — CEFEPIME 1 G/1
2000 INJECTION, POWDER, FOR SOLUTION INTRAMUSCULAR; INTRAVENOUS ONCE
Refills: 0 | Status: COMPLETED | OUTPATIENT
Start: 2021-01-21 | End: 2021-01-21

## 2021-01-21 RX ORDER — ONDANSETRON 8 MG/1
4 TABLET, FILM COATED ORAL EVERY 6 HOURS
Refills: 0 | Status: DISCONTINUED | OUTPATIENT
Start: 2021-01-21 | End: 2021-01-26

## 2021-01-21 RX ORDER — VANCOMYCIN HCL 1 G
1000 VIAL (EA) INTRAVENOUS ONCE
Refills: 0 | Status: DISCONTINUED | OUTPATIENT
Start: 2021-01-21 | End: 2021-01-21

## 2021-01-21 RX ADMIN — Medication 30 MILLIGRAM(S): at 13:00

## 2021-01-21 RX ADMIN — Medication 600 MILLIGRAM(S): at 21:40

## 2021-01-21 RX ADMIN — CEFEPIME 100 MILLIGRAM(S): 1 INJECTION, POWDER, FOR SOLUTION INTRAMUSCULAR; INTRAVENOUS at 15:33

## 2021-01-21 RX ADMIN — Medication 166.67 MILLIGRAM(S): at 15:33

## 2021-01-21 RX ADMIN — SODIUM CHLORIDE 2000 MILLILITER(S): 9 INJECTION INTRAMUSCULAR; INTRAVENOUS; SUBCUTANEOUS at 13:00

## 2021-01-21 RX ADMIN — SODIUM CHLORIDE 80 MILLILITER(S): 9 INJECTION INTRAMUSCULAR; INTRAVENOUS; SUBCUTANEOUS at 18:22

## 2021-01-21 RX ADMIN — Medication 650 MILLIGRAM(S): at 18:21

## 2021-01-21 RX ADMIN — SODIUM CHLORIDE 2000 MILLILITER(S): 9 INJECTION INTRAMUSCULAR; INTRAVENOUS; SUBCUTANEOUS at 15:33

## 2021-01-21 NOTE — ED STATDOCS - NS_ ATTENDINGSCRIBEDETAILS _ED_A_ED_FT
I, Andrew Bazan MD,  performed the initial face to face bedside interview with this patient regarding history of present illness, review of symptoms and relevant past medical, social and family history.  I completed an independent physical examination.    The history, relevant review of systems, past medical and surgical history, medical decision making, and physical examination was documented by the scribe in my presence and I attest to the accuracy of the documentation.

## 2021-01-21 NOTE — ED STATDOCS - PHYSICAL EXAMINATION
PA NOTE: GEN: +Febrile. AOX3, NAD. HEENT: Throat clear. Airway is patent. EYES: PERRLA. EOMI. Head: NC/AT. NECK: Supple, No JVD. FROM. C-spine non-tender. CV:S1S2, RRR, LUNGS: Non-labored breathing, no tachypnea. O2sat 100% RA. CTA b/l. No w/r/r. CHEST: Equal chest expansion and rise. No deformity. ABD: Soft, NT/ND, no rebound, no guarding. No CVAT. EXT: No e/c/c. 2+ distal pulses. SKIN: No rashes. NEURO: No focal deficits. CN II-XII intact. FROM. 5/5 motor and sensory. ~Virgilio Mandujano PA-C

## 2021-01-21 NOTE — ED STATDOCS - PROGRESS NOTE DETAILS
PA: 44 y/o M with PMHx of IBS, BPH, polyp of colon, hemorrhoids, panic disorder, anxiety, and s/p hernia repair presents to the ED sent by PCP for eval of +fever and +chills. Reports 2 weeks ago went to see urology for difficulty urinating and started on Cefuroxime for possible prostatitis, now resolved. Then on 1/18 was told he is COVID+ as well. Has been taking Tylenol and Motrin for fever, Tmax 103 F. Denies cough or chest pain. NKDA. PCP: Dr. Chalo Malave. PA: WBC 1.9. Waiting for differential. Likely TBA for Febrile Neutropenia. ~Virgilio Mandujano PA-C PA: Spoke with hospitalist Dr. Gallegos, will admit patient. ~Virgilio Mandujano PA-C PA: Patient is a 44 y/o male with PMHx of IBS, BPH, polyp of colon, hemorrhoids, panic disorder, anxiety, and s/p hernia repair who presents to [facility] c/o cough, fever, chills x 4 days. Patient was diagnosed with COVID19+ 4 days ago. Patient is also taking Cefuroxime for suspected Prostatitis, given by his Urologist last week. Patient has been taking Tylenol and Motrin for fever, Tmax 103 F. ~Virgilio Mandujano PA-C   Patient seen and evaluated in [facility] Will get labs, CXR. Reassess. ~Virgilio Mandujano PA-C

## 2021-01-21 NOTE — H&P ADULT - NSHPPHYSICALEXAM_GEN_ALL_CORE
T(C): 37.9 (21 Jan 2021 15:20), Max: 37.9 (21 Jan 2021 12:28)  T(F): 100.2 (21 Jan 2021 15:20), Max: 100.3 (21 Jan 2021 12:28)  HR: 92 (21 Jan 2021 15:20) (92 - 100)  BP: 117/70 (21 Jan 2021 15:20) (112/74 - 117/70)  BP(mean): 80 (21 Jan 2021 15:20) (80 - 84)  RR: 18 (21 Jan 2021 15:20) (18 - 18)  SpO2: 96% (21 Jan 2021 15:20) (95% - 96%)    · CONSTITUTIONAL: well appearing and in no apparent distress.  · EYES: clear bilaterally.  Pupils equal, round, and reactive to light.  · ENMT: Nasal mucosa clear.  Mouth with normal mucosa  Throat has no vesicles, no oropharyngeal exudates and uvula is midline.  · CARDIAC: normal rate, regular rhythm, and no murmur.  · RESPIRATORY: breath sounds clear and equal bilaterally.  · GASTROINTESTINAL: abdomen soft, non-tender, and non-distended. Bowel sounds present.  · MUSCULOSKELETAL: range of motion is not limited and there is no muscle tenderness.  · NEUROLOGICAL: sensation is normal and strength is normal.  · SKIN: skin normal color for race, warm, dry and intact.
Other/Poor

## 2021-01-21 NOTE — ED STATDOCS - OBJECTIVE STATEMENT
46 y/o M with PMHx of IBS, BPH, polyp of colon, hemorrhoids, panic disorder, anxiety, and s/p hernia repair presents to the ED sent by PCP for eval of +fever and +chills. Reports 2 weeks ago went to see urology for difficulty urinating and started on Cefuroxime for possible prostatitis, now resolved. Then on 1/18 was told he is COVID+ as well. Has been taking Tylenol and Motrin for fever, Tmax 103 F. Denies cough or chest pain. NKDA. PCP: Dr. Chalo Malave.

## 2021-01-21 NOTE — H&P ADULT - ASSESSMENT
* Febrile syndrome and COVID+ not hypoxic  monitor overnight and check O2 sat in am rest and ambulation  repeat cbc in am if stable and neutropenic dc home with Pox monitoring     * Febrile syndrome and COVID+ not hypoxic  monitor overnight and check O2 sat in am rest and ambulation  repeat cbc in am if stable and neutropenic dc home with Pox monitoring  IVF for poor PO intake

## 2021-01-21 NOTE — ED ADULT TRIAGE NOTE - CHIEF COMPLAINT QUOTE
Patient presents with covid + on 1/18. Patient states he has had a fever for 7 days, reports dizziness and urinary symptoms. Patient denies shortness of breath

## 2021-01-21 NOTE — ED ADULT NURSE NOTE - OBJECTIVE STATEMENT
Pt c/o weakness ,fever. diagnosed with covid on 1/18/21.pt is currently on abx for prostatitis. no sob.

## 2021-01-21 NOTE — H&P ADULT - NSHPLABSRESULTS_GEN_ALL_CORE
13.0   1.96  )-----------( 111      ( 21 Jan 2021 12:51 )             37.5   01-21    137  |  105  |  9   ----------------------------<  97  3.9   |  28  |  0.86    Ca    7.4<L>      21 Jan 2021 12:51

## 2021-01-21 NOTE — ED ADULT NURSE REASSESSMENT NOTE - NS ED NURSE REASSESS COMMENT FT1
Patient is under the impression that he is leaving. Multiple calls made to hospitalist phone as well as personally to MD Gallegos and Aldo, even paged overhead, without a call back. Attempting to get patient to stay or at least wait until we hear from one of the multiple MD's
Pt was thinking to leave the hospital after spoke to the RN [t decided to stay .Dr Gallegos is aware.
Pt spiked fever Tylenol 650mg po was given.

## 2021-01-21 NOTE — ED STATDOCS - PMH
Anxiety    BPH (benign prostatic hyperplasia)    Hemorrhoids    IBS (irritable bowel syndrome)    Panic disorder    Polyp of colon

## 2021-01-21 NOTE — H&P ADULT - HISTORY OF PRESENT ILLNESS
46 y/o M with PMHx of IBS, BPH, polyp of colon, hemorrhoids, panic disorder, anxiety, and s/p hernia repair presents to the ED sent by PCP for eval of +fever and +chills. Reports 2 weeks ago went to see urology for difficulty urinating and started on Cefuroxime for possible prostatitis, now resolved. Then on 1/18 was told he is COVID+ as well. Has been taking Tylenol and Motrin for fever, Tmax 103 F. Denies cough or chest pain. NKDA. PCP: Dr. Chalo Malave.       PMHx/ PSHx:  of IBS, BPH, polyp of colon, hemorrhoids, panic disorder, anxiety, and s/p hernia repair  44 y/o M with PMHx of IBS, BPH, polyp of colon, hemorrhoids, panic disorder, anxiety, and s/p hernia repair presents to the ED sent by PCP for eval of +fever and +chills. Reports 2 weeks ago went to see urology for difficulty urinating and started on Cefuroxime for possible prostatitis, now resolved. Then on 1/18 was told he is COVID+ as well. Has been taking Tylenol and Motrin for fever, Tmax 103 F. Denies cough or chest pain. NKDA. PCP: Dr. Chalo Malave. Offer to dc the pt but he is v concerned about the WBC after ED doc told him he needs to stay and received IV abx; I explained that is not indicated. Pt has v poor appetite and has not have a meal in 3 days; adm for IVF and is stable dc home in am       PMHx/ PSHx:  of IBS, BPH, polyp of colon, hemorrhoids, panic disorder, anxiety, and s/p hernia repair

## 2021-01-22 ENCOUNTER — APPOINTMENT (OUTPATIENT)
Dept: UROLOGY | Facility: CLINIC | Age: 46
End: 2021-01-22

## 2021-01-22 LAB
ALBUMIN SERPL ELPH-MCNC: 2.7 G/DL — LOW (ref 3.3–5)
ALP SERPL-CCNC: 64 U/L — SIGNIFICANT CHANGE UP (ref 40–120)
ALT FLD-CCNC: 28 U/L — SIGNIFICANT CHANGE UP (ref 12–78)
AST SERPL-CCNC: 44 U/L — HIGH (ref 15–37)
BILIRUB DIRECT SERPL-MCNC: 0.1 MG/DL — SIGNIFICANT CHANGE UP (ref 0–0.2)
BILIRUB INDIRECT FLD-MCNC: 0.3 MG/DL — SIGNIFICANT CHANGE UP (ref 0.2–1)
BILIRUB SERPL-MCNC: 0.4 MG/DL — SIGNIFICANT CHANGE UP (ref 0.2–1.2)
CREAT SERPL-MCNC: 0.72 MG/DL — SIGNIFICANT CHANGE UP (ref 0.5–1.3)
CULTURE RESULTS: NO GROWTH — SIGNIFICANT CHANGE UP
HCT VFR BLD CALC: 34.8 % — LOW (ref 39–50)
HGB BLD-MCNC: 12.1 G/DL — LOW (ref 13–17)
MCHC RBC-ENTMCNC: 31 PG — SIGNIFICANT CHANGE UP (ref 27–34)
MCHC RBC-ENTMCNC: 34.8 GM/DL — SIGNIFICANT CHANGE UP (ref 32–36)
MCV RBC AUTO: 89.2 FL — SIGNIFICANT CHANGE UP (ref 80–100)
PLATELET # BLD AUTO: 102 K/UL — LOW (ref 150–400)
PROT SERPL-MCNC: 6.2 GM/DL — SIGNIFICANT CHANGE UP (ref 6–8.3)
RBC # BLD: 3.9 M/UL — LOW (ref 4.2–5.8)
RBC # FLD: 11.2 % — SIGNIFICANT CHANGE UP (ref 10.3–14.5)
SARS-COV-2 IGG SERPL QL IA: NEGATIVE — SIGNIFICANT CHANGE UP
SARS-COV-2 IGM SERPL IA-ACNC: <0.1 INDEX — SIGNIFICANT CHANGE UP
SPECIMEN SOURCE: SIGNIFICANT CHANGE UP
WBC # BLD: 2.19 K/UL — LOW (ref 3.8–10.5)
WBC # FLD AUTO: 2.19 K/UL — LOW (ref 3.8–10.5)

## 2021-01-22 PROCEDURE — 99232 SBSQ HOSP IP/OBS MODERATE 35: CPT

## 2021-01-22 RX ORDER — DEXAMETHASONE 0.5 MG/5ML
6 ELIXIR ORAL DAILY
Refills: 0 | Status: DISCONTINUED | OUTPATIENT
Start: 2021-01-22 | End: 2021-01-26

## 2021-01-22 RX ORDER — CEFUROXIME AXETIL 250 MG
500 TABLET ORAL EVERY 12 HOURS
Refills: 0 | Status: DISCONTINUED | OUTPATIENT
Start: 2021-01-22 | End: 2021-01-26

## 2021-01-22 RX ORDER — GUAIFENESIN/DEXTROMETHORPHAN 600MG-30MG
10 TABLET, EXTENDED RELEASE 12 HR ORAL EVERY 6 HOURS
Refills: 0 | Status: DISCONTINUED | OUTPATIENT
Start: 2021-01-22 | End: 2021-01-26

## 2021-01-22 RX ORDER — REMDESIVIR 5 MG/ML
200 INJECTION INTRAVENOUS EVERY 24 HOURS
Refills: 0 | Status: COMPLETED | OUTPATIENT
Start: 2021-01-22 | End: 2021-01-22

## 2021-01-22 RX ORDER — ALBUTEROL 90 UG/1
2 AEROSOL, METERED ORAL EVERY 6 HOURS
Refills: 0 | Status: DISCONTINUED | OUTPATIENT
Start: 2021-01-22 | End: 2021-01-26

## 2021-01-22 RX ORDER — REMDESIVIR 5 MG/ML
INJECTION INTRAVENOUS
Refills: 0 | Status: COMPLETED | OUTPATIENT
Start: 2021-01-22 | End: 2021-01-26

## 2021-01-22 RX ORDER — IBUPROFEN 200 MG
600 TABLET ORAL ONCE
Refills: 0 | Status: COMPLETED | OUTPATIENT
Start: 2021-01-22 | End: 2021-01-22

## 2021-01-22 RX ORDER — REMDESIVIR 5 MG/ML
100 INJECTION INTRAVENOUS EVERY 24 HOURS
Refills: 0 | Status: COMPLETED | OUTPATIENT
Start: 2021-01-23 | End: 2021-01-26

## 2021-01-22 RX ADMIN — SODIUM CHLORIDE 80 MILLILITER(S): 9 INJECTION INTRAMUSCULAR; INTRAVENOUS; SUBCUTANEOUS at 07:25

## 2021-01-22 RX ADMIN — Medication 6 MILLIGRAM(S): at 13:30

## 2021-01-22 RX ADMIN — Medication 100 MILLIGRAM(S): at 06:30

## 2021-01-22 RX ADMIN — Medication 600 MILLIGRAM(S): at 13:30

## 2021-01-22 RX ADMIN — ENOXAPARIN SODIUM 40 MILLIGRAM(S): 100 INJECTION SUBCUTANEOUS at 09:59

## 2021-01-22 RX ADMIN — Medication 500 MILLIGRAM(S): at 21:41

## 2021-01-22 RX ADMIN — ALBUTEROL 2 PUFF(S): 90 AEROSOL, METERED ORAL at 21:20

## 2021-01-22 RX ADMIN — Medication 650 MILLIGRAM(S): at 04:17

## 2021-01-22 RX ADMIN — Medication 10 MILLILITER(S): at 09:25

## 2021-01-22 RX ADMIN — Medication 650 MILLIGRAM(S): at 09:59

## 2021-01-22 RX ADMIN — REMDESIVIR 580 MILLIGRAM(S): 5 INJECTION INTRAVENOUS at 15:03

## 2021-01-22 NOTE — DIETITIAN INITIAL EVALUATION ADULT. - PERTINENT MEDS FT
MEDICATIONS  (STANDING):  dexAMETHasone  Injectable 6 milliGRAM(s) IV Push daily  enoxaparin Injectable 40 milliGRAM(s) SubCutaneous daily  remdesivir  IVPB   IV Intermittent   sodium chloride 0.9%. 1000 milliLiter(s) (80 mL/Hr) IV Continuous <Continuous>    MEDICATIONS  (PRN):  acetaminophen   Tablet .. 650 milliGRAM(s) Oral every 6 hours PRN Temp greater or equal to 38.5C (101.3F), Mild Pain (1 - 3)  aluminum hydroxide/magnesium hydroxide/simethicone Suspension 30 milliLiter(s) Oral every 4 hours PRN Dyspepsia  benzonatate 100 milliGRAM(s) Oral three times a day PRN Cough  guaifenesin/dextromethorphan  Syrup 10 milliLiter(s) Oral every 6 hours PRN Cough  ondansetron Injectable 4 milliGRAM(s) IV Push every 6 hours PRN Nausea

## 2021-01-22 NOTE — CONSULT NOTE ADULT - SUBJECTIVE AND OBJECTIVE BOX
Patient is a 45y old  Male who presents with a chief complaint of fever     HPI:  44 y/o Male with h/o IBS, BPH, polyp of colon, hemorrhoids, panic disorder, anxiety, hernia repair was admitted on  for fever and chills. Reports 2 weeks ago went to see urology for difficulty urinating and started on cefuroxime for possible prostatitis. Dysuria has resolved. Then on  was told he is COVID+. Has been taking Tylenol and Motrin for fever, Tmax 103 F. Denies cough or chest pain. Pt has poor appetite and has not have a meal in 3 days.       PMHx/ PSHx:  of IBS, BPH, polyp of colon, hemorrhoids, panic disorder, anxiety, and s/p hernia repair       PMH: as above  PSH: as above  Meds: per reconciliation sheet, noted below  MEDICATIONS  (STANDING):  dexAMETHasone  Injectable 6 milliGRAM(s) IV Push daily  enoxaparin Injectable 40 milliGRAM(s) SubCutaneous daily  sodium chloride 0.9%. 1000 milliLiter(s) (80 mL/Hr) IV Continuous <Continuous>    MEDICATIONS  (PRN):  acetaminophen   Tablet .. 650 milliGRAM(s) Oral every 6 hours PRN Temp greater or equal to 38.5C (101.3F), Mild Pain (1 - 3)  aluminum hydroxide/magnesium hydroxide/simethicone Suspension 30 milliLiter(s) Oral every 4 hours PRN Dyspepsia  benzonatate 100 milliGRAM(s) Oral three times a day PRN Cough  guaifenesin/dextromethorphan  Syrup 10 milliLiter(s) Oral every 6 hours PRN Cough  ondansetron Injectable 4 milliGRAM(s) IV Push every 6 hours PRN Nausea    Allergies    No Known Allergies    Intolerances      Social: no smoking, no alcohol, no illegal drugs; no recent travel, no exposure to TB  FAMILY HISTORY:    no history of premature cardiovascular disease in first degree relatives    ROS: the patient denies HA, no seizures, no dizziness, no sore throat, no nasal congestion, no blurry vision, no CP, no palpitations, no SOB, no cough, no abdominal pain, no diarrhea, no N/V, no dysuria, no leg pain, no claudication, no rash, no joint aches, no rectal pain or bleeding, no night sweats; has increased weakness  All other systems reviewed and are negative    Vital Signs Last 24 Hrs  T(C): 39.2 (2021 09:59), Max: 39.4 (2021 21:39)  T(F): 102.6 (2021 09:59), Max: 103 (2021 21:39)  HR: 104 (2021 09:59) (89 - 106)  BP: 122/73 (2021 08:38) (112/59 - 132/80)  BP(mean): 84 (2021 19:40) (80 - 99)  RR: 21 (2021 09:59) (17 - 21)  SpO2: 93% (2021 09:59) (90% - 96%)  Daily     Daily     PE:    Constitutional:  No acute distress  HEENT: NC/AT, EOMI, PERRLA, conjunctivae clear; ears and nose atraumatic; pharynx benign  Neck: supple; thyroid not palpable  Back: no tenderness  Respiratory: respiratory effort normal; few crackles at bases  Cardiovascular: S1S2 regular, no murmurs  Abdomen: soft, not tender, not distended, positive BS; no liver or spleen organomegaly  Genitourinary: no suprapubic tenderness  Lymphatic: no LN palpable  Musculoskeletal: no muscle tenderness, no joint swelling or tenderness  Extremities: no pedal edema  Neurological/ Psychiatric: AxOx3, judgement and insight normal; moving all extremities  Skin: no rashes; no palpable lesions    Labs: all available labs reviewed                        12.1   2.19  )-----------( 102      ( 2021 10:15 )             34.8     -    137  |  105  |  9   ----------------------------<  97  3.9   |  28  |  0.86    Ca    7.4<L>      2021 12:51         Urinalysis Basic - ( 2021 12:51 )    Color: Yellow / Appearance: Clear / S.015 / pH: x  Gluc: x / Ketone: Trace  / Bili: Negative / Urobili: Negative mg/dL   Blood: x / Protein: 30 mg/dL / Nitrite: Negative   Leuk Esterase: Trace / RBC: Negative /HPF / WBC 0-2   Sq Epi: x / Non Sq Epi: Few / Bacteria: Occasional    Outpatient COVID-10 PCR test on  detected    Radiology: all available radiological tests reviewed    < from: Xray Chest 1 View-PORTABLE IMMEDIATE (Xray Chest 1 View-PORTABLE IMMEDIATE .) (21 @ 13:29) >  Mid lateral lung field infiltrates are consistent with Covid pneumonia.    < end of copied text >      Advanced directives addressed: full resuscitation

## 2021-01-22 NOTE — DIETITIAN INITIAL EVALUATION ADULT. - PERTINENT LABORATORY DATA
01-21    137  |  105  |  9   ----------------------------<  97  3.9   |  28  |  0.86    Ca    7.4<L>      21 Jan 2021 12:51

## 2021-01-22 NOTE — CONSULT NOTE ADULT - ASSESSMENT
44 y/o Male with h/o IBS, BPH, polyp of colon, hemorrhoids, panic disorder, anxiety, hernia repair was admitted on 1/21 for fever and chills. Reports 2 weeks ago went to see urology for difficulty urinating and started on cefuroxime for possible prostatitis. Dysuria has resolved. Then on 1/18 was told he is COVID+. Has been taking Tylenol and Motrin for fever, Tmax 103 F. Denies cough or chest pain. Pt has poor appetite and has not have a meal in 3 days.    1. Febrile syndrome. COVID-19 viral syndrome. Multifocal pneumonia.  -febrile with chills  -obtain inflammatory markers  -hypoxia to 90% on RA  -CXR showing infiltrates  -on steroids  -on AC  -would favor starting remdesivir; will d/w Dr. Gallegos  -monitor respiratory status  -old chart reviewed to assess prior cultures  -droplet isolation  -monitor temps  -f/u CBC  -supportive care  2. Other issues:   -care per medicine     46 y/o Male with h/o IBS, BPH, polyp of colon, hemorrhoids, panic disorder, anxiety, hernia repair was admitted on 1/21 for fever and chills. Reports 2 weeks ago went to see urology for difficulty urinating and started on cefuroxime for possible prostatitis. Dysuria has resolved. Then on 1/18 was told he is COVID+. Has been taking Tylenol and Motrin for fever, Tmax 103 F. Denies cough or chest pain. Pt has poor appetite and has not have a meal in 3 days.    1. Febrile syndrome. COVID-19 viral syndrome. Multifocal pneumonia.  -febrile with chills  -obtain inflammatory markers  -hypoxia to 90% on RA  -CXR showing infiltrates  -on steroids  -on AC  -start remdesivir protocol  -remdesivir EUA, risks and benefits reviewed with patient and he agreed with the use of the antiviral medication  -monitor respiratory status  -old chart reviewed to assess prior cultures  -droplet isolation  -monitor temps  -f/u CBC  -supportive care  2. Other issues:   -care per medicine

## 2021-01-22 NOTE — DIETITIAN NUTRITION RISK NOTIFICATION - ADDITIONAL COMMENTS/DIETITIAN RECOMMENDATIONS
RECOMMENDATIONS:  1) add gelatein TID   2) add MVI with minerals daily   3) consider checking vitamin D level and supplement prn   4) daily wt checks to track/trend changes

## 2021-01-22 NOTE — DIETITIAN INITIAL EVALUATION ADULT. - ORAL INTAKE PTA/DIET HISTORY
poor PO intake x 1 wk; 3 days of just eating crackers and oranges; 3 days of eating a little soup; 6 days of meeting <50% of estimated nutr needs.  pt is lactose intolerant.

## 2021-01-22 NOTE — DIETITIAN INITIAL EVALUATION ADULT. - MALNUTRITION
moderate malnutrition in acute illness r/t COVID AEB meeting <50% of estimated nutr needs x 6 days; 1% wt loss x 1 wk. moderate malnutrition in acute illness

## 2021-01-22 NOTE — PROGRESS NOTE ADULT - SUBJECTIVE AND OBJECTIVE BOX
44 y/o M with PMHx of IBS, BPH, polyp of colon, hemorrhoids, panic disorder, anxiety, and s/p hernia repair presents to the ED sent by PCP for eval of +fever and +chills. Reports 2 weeks ago went to see urology for difficulty urinating and started on Cefuroxime for possible prostatitis, now resolved. Then on  was told he is COVID+ as well. Has been taking Tylenol and Motrin for fever, Tmax 103 F. Denies cough or chest pain. NKDA. PCP: Dr. Chalo Malave. Offer to dc the pt but he is v concerned about the WBC after ED doc told him he needs to stay and received IV abx; I explained that is not indicated. Pt has v poor appetite and has not have a meal in 3 days;      21: Patient seen and examined. Still with fever. O2 sats on RA down to 90%. Discussed with patient in length regarding management plan.       Vital Signs Last 24 Hrs  T(C): 39.1 (2021 13:39), Max: 39.4 (2021 21:39)  T(F): 102.4 (2021 13:39), Max: 103 (2021 21:39)  HR: 104 (2021 09:59) (89 - 106)  BP: 122/73 (2021 08:38) (112/59 - 132/80)  BP(mean): 84 (2021 19:40) (84 - 99)  RR: 21 (2021 09:59) (17 - 21)  SpO2: 93% (2021 09:59) (90% - 96%)      · CONSTITUTIONAL: well appearing and in no apparent distress.  · EYES: clear bilaterally.  Pupils equal, round, and reactive to light.  · ENMT: Nasal mucosa clear.  Mouth with normal mucosa  Throat has no vesicles, no oropharyngeal exudates and uvula is midline.  · CARDIAC: normal rate, regular rhythm, and no murmur.  · RESPIRATORY: breath sounds clear and equal bilaterally.  · GASTROINTESTINAL: abdomen soft, non-tender, and non-distended. Bowel sounds present.  · MUSCULOSKELETAL: range of motion is not limited and there is no muscle tenderness.  · NEUROLOGICAL: sensation is normal and strength is normal.  · SKIN: skin normal color for race, warm, dry and intact.                              12.1   2.19  )-----------( 102      ( 2021 10:15 )             34.8     2021 12:51    137    |  105    |  9      ----------------------------<  97     3.9     |  28     |  0.86     Ca    7.4        2021 12:51          CAPILLARY BLOOD GLUCOSE            Urinalysis Basic - ( 2021 12:51 )    Color: Yellow / Appearance: Clear / S.015 / pH: x  Gluc: x / Ketone: Trace  / Bili: Negative / Urobili: Negative mg/dL   Blood: x / Protein: 30 mg/dL / Nitrite: Negative   Leuk Esterase: Trace / RBC: Negative /HPF / WBC 0-2   Sq Epi: x / Non Sq Epi: Few / Bacteria: Occasional            MEDICATIONS  (STANDING):  dexAMETHasone  Injectable 6 milliGRAM(s) IV Push daily  enoxaparin Injectable 40 milliGRAM(s) SubCutaneous daily  remdesivir  IVPB   IV Intermittent   sodium chloride 0.9%. 1000 milliLiter(s) (80 mL/Hr) IV Continuous <Continuous>    MEDICATIONS  (PRN):  acetaminophen   Tablet .. 650 milliGRAM(s) Oral every 6 hours PRN Temp greater or equal to 38.5C (101.3F), Mild Pain (1 - 3)  aluminum hydroxide/magnesium hydroxide/simethicone Suspension 30 milliLiter(s) Oral every 4 hours PRN Dyspepsia  benzonatate 100 milliGRAM(s) Oral three times a day PRN Cough  guaifenesin/dextromethorphan  Syrup 10 milliLiter(s) Oral every 6 hours PRN Cough  ondansetron Injectable 4 milliGRAM(s) IV Push every 6 hours PRN Nausea            Assessment and plan:       1. Acute hypoxic resp failure   Multifocal pneumonia due to COVID-19 infection  O2 support  Started IV decadron today  Dr Bernal consult appreciated  IV remdesivir started today  Supportive care  Start albuterol neb    2. H/O recent prostatis  On po ceftin as an outpatient  Will continue for ne more week    3. DVT prophylaxis.

## 2021-01-22 NOTE — DIETITIAN INITIAL EVALUATION ADULT. - OTHER INFO
44yo male with PMH significant for IBS, poly of colon, hemorrhoids, panic disorder, anxiety, s/p hernia repair p/w fever and chills.  Pt admitted with COVID, febrile syndrome.

## 2021-01-23 DIAGNOSIS — Z98.890 OTHER SPECIFIED POSTPROCEDURAL STATES: Chronic | ICD-10-CM

## 2021-01-23 DIAGNOSIS — Z90.89 ACQUIRED ABSENCE OF OTHER ORGANS: Chronic | ICD-10-CM

## 2021-01-23 LAB
ALBUMIN SERPL ELPH-MCNC: 3 G/DL — LOW (ref 3.3–5)
ALP SERPL-CCNC: 61 U/L — SIGNIFICANT CHANGE UP (ref 40–120)
ALT FLD-CCNC: 31 U/L — SIGNIFICANT CHANGE UP (ref 12–78)
AST SERPL-CCNC: 50 U/L — HIGH (ref 15–37)
BILIRUB DIRECT SERPL-MCNC: <0.1 MG/DL — SIGNIFICANT CHANGE UP (ref 0–0.2)
BILIRUB INDIRECT FLD-MCNC: >0.3 MG/DL — SIGNIFICANT CHANGE UP (ref 0.2–1)
BILIRUB SERPL-MCNC: 0.4 MG/DL — SIGNIFICANT CHANGE UP (ref 0.2–1.2)
CRP SERPL-MCNC: 3.01 MG/DL — HIGH (ref 0–0.4)
D DIMER BLD IA.RAPID-MCNC: 220 NG/ML DDU — SIGNIFICANT CHANGE UP
FERRITIN SERPL-MCNC: 2183 NG/ML — HIGH (ref 30–400)
HCT VFR BLD CALC: 38.7 % — LOW (ref 39–50)
HGB BLD-MCNC: 13.5 G/DL — SIGNIFICANT CHANGE UP (ref 13–17)
MCHC RBC-ENTMCNC: 30.8 PG — SIGNIFICANT CHANGE UP (ref 27–34)
MCHC RBC-ENTMCNC: 34.9 GM/DL — SIGNIFICANT CHANGE UP (ref 32–36)
MCV RBC AUTO: 88.2 FL — SIGNIFICANT CHANGE UP (ref 80–100)
PLATELET # BLD AUTO: 135 K/UL — LOW (ref 150–400)
PROT SERPL-MCNC: 6.5 GM/DL — SIGNIFICANT CHANGE UP (ref 6–8.3)
RBC # BLD: 4.39 M/UL — SIGNIFICANT CHANGE UP (ref 4.2–5.8)
RBC # FLD: 11.3 % — SIGNIFICANT CHANGE UP (ref 10.3–14.5)
WBC # BLD: 3.76 K/UL — LOW (ref 3.8–10.5)
WBC # FLD AUTO: 3.76 K/UL — LOW (ref 3.8–10.5)

## 2021-01-23 PROCEDURE — 99232 SBSQ HOSP IP/OBS MODERATE 35: CPT

## 2021-01-23 RX ADMIN — Medication 10 MILLILITER(S): at 12:03

## 2021-01-23 RX ADMIN — ALBUTEROL 2 PUFF(S): 90 AEROSOL, METERED ORAL at 13:46

## 2021-01-23 RX ADMIN — Medication 6 MILLIGRAM(S): at 09:31

## 2021-01-23 RX ADMIN — ALBUTEROL 2 PUFF(S): 90 AEROSOL, METERED ORAL at 02:39

## 2021-01-23 RX ADMIN — ENOXAPARIN SODIUM 40 MILLIGRAM(S): 100 INJECTION SUBCUTANEOUS at 09:31

## 2021-01-23 RX ADMIN — Medication 500 MILLIGRAM(S): at 09:31

## 2021-01-23 RX ADMIN — ALBUTEROL 2 PUFF(S): 90 AEROSOL, METERED ORAL at 07:48

## 2021-01-23 RX ADMIN — Medication 500 MILLIGRAM(S): at 20:13

## 2021-01-23 RX ADMIN — SODIUM CHLORIDE 80 MILLILITER(S): 9 INJECTION INTRAMUSCULAR; INTRAVENOUS; SUBCUTANEOUS at 13:59

## 2021-01-23 RX ADMIN — ALBUTEROL 2 PUFF(S): 90 AEROSOL, METERED ORAL at 21:09

## 2021-01-23 RX ADMIN — REMDESIVIR 540 MILLIGRAM(S): 5 INJECTION INTRAVENOUS at 14:00

## 2021-01-23 NOTE — PROGRESS NOTE ADULT - SUBJECTIVE AND OBJECTIVE BOX
46 y/o M with PMHx of IBS, BPH, polyp of colon, hemorrhoids, panic disorder, anxiety, and s/p hernia repair presents to the ED sent by PCP for eval of +fever and +chills. Reports 2 weeks ago went to see urology for difficulty urinating and started on Cefuroxime for possible prostatitis, now resolved. Then on 1/18 was told he is COVID+ as well. Has been taking Tylenol and Motrin for fever, Tmax 103 F. Denies cough or chest pain. NKDA. PCP: Dr. Chalo Malave. Offer to dc the pt but he is v concerned about the WBC after ED doc told him he needs to stay and received IV abx; I explained that is not indicated. Pt has v poor appetite and has not have a meal in 3 days;    -pt was admitted due to COVID PNA  CXR- Bilateral infiltrates   d dimer-220    1/23-pt seen and examined. Walking inside t    46 y/o M with PMHx of IBS, BPH, polyp of colon, hemorrhoids, panic disorder, anxiety, and s/p hernia repair presents to the ED sent by PCP for eval of +fever and +chills. Reports 2 weeks ago went to see urology for difficulty urinating and started on Cefuroxime for possible prostatitis, now resolved. Then on 1/18 was told he is COVID+ as well. Has been taking Tylenol and Motrin for fever, Tmax 103 F. Denies cough or chest pain. NKDA. PCP: Dr. Chalo Malave. Offer to dc the pt but he is v concerned about the WBC after ED doc told him he needs to stay and received IV abx; I explained that is not indicated. Pt has v poor appetite and has not have a meal in 3 days;    -pt was admitted due to COVID PNA  CXR- Bilateral infiltrates   d dimer-220    1/23-pt seen and examined. Walking inside the room on NC 2L. State breathing slightly better today. Denies any fevers, chills, chest pain or dizziness.    Review of Systems:   CONSTITUTIONAL: No fever. No Weakness  EYES: No eye pain or discharge.  ENMT:  No sinus or throat pain  NECK: No pain or stiffness  RESPIRATORY: No cough, wheezing, chills or hemoptysis; +mild shortness of breath  CARDIOVASCULAR: No chest pain, palpitations, dizziness, or leg swelling  GASTROINTESTINAL: No abdominal or epigastric pain. No nausea, vomiting, or hematemesis; No diarrhea or constipation. No melena or hematochezia.  GENITOURINARY: No dysuria or incontinence  NEUROLOGICAL: No headaches, memory loss, loss of strength, numbness, or tremors  SKIN: No rashes.  MUSCULOSKELETAL: No joint pain or swelling; No muscle, back, or extremity pain  PSYCHIATRIC: No depression, anxiety, mood swings, or difficulty sleeping    Vital Signs Last 24 Hrs  T(C): 36.7 (23 Jan 2021 08:56), Max: 36.8 (22 Jan 2021 23:40)  T(F): 98.1 (23 Jan 2021 08:56), Max: 98.2 (22 Jan 2021 23:40)  HR: 76 (23 Jan 2021 13:47) (76 - 96)  BP: 109/61 (23 Jan 2021 08:56) (109/61 - 115/67)  BP(mean): --  RR: 18 (23 Jan 2021 08:56) (18 - 19)  SpO2: 90% (23 Jan 2021 08:56) (90% - 93%)    PHYSICAL EXAM:    GENERAL: NAD, well-groomed, well-developed  HEAD:  Atraumatic, Normocephalic  EYES: EOMI, PERRLA, conjunctiva and sclera clear  HEENT: Moist mucous membranes  NECK: Supple, No JVD  CHEST/LUNG: Clear to auscultation bilaterally; No rales, rhonchi, wheezing, or rubs  HEART: Regular rate and rhythm; No murmurs, rubs, or gallops  ABDOMEN: Soft, Nontender, Nondistended; Bowel sounds present  GENITOURINARY: Voiding, no palpable bladder  EXTREMITIES:  2+ Peripheral Pulses, No clubbing, cyanosis, or edema  MUSCULOSKELTAL- No muscle tenderness, Muscle tone normal, No joint tenderness, no Joint swelling, Joint range of motion-normal  SKIN- no rash or ulcer  NERVOUS SYSTEM:  Awake and alert, oriented x3, CNII-XII intact;  no focal deficits                          13.5   3.76  )-----------( 135      ( 23 Jan 2021 10:35 )             38.7   01-23    139  |  106  |  10  ----------------------------<  155<H>  3.8   |  27  |  0.89    Ca    8.3<L>      23 Jan 2021 10:35    TPro  6.5  /  Alb  3.0<L>  /  TBili  0.4  /  DBili  <0.1  /  AST  50<H>  /  ALT  31  /  AlkPhos  61  01-23  MEDICATIONS  (STANDING):  ALBUTerol    90 MICROgram(s) HFA Inhaler 2 Puff(s) Inhalation every 6 hours  cefuroxime   Tablet 500 milliGRAM(s) Oral every 12 hours  dexAMETHasone  Injectable 6 milliGRAM(s) IV Push daily  enoxaparin Injectable 40 milliGRAM(s) SubCutaneous daily  remdesivir  IVPB   IV Intermittent   remdesivir  IVPB 100 milliGRAM(s) IV Intermittent every 24 hours    MEDICATIONS  (PRN):  acetaminophen   Tablet .. 650 milliGRAM(s) Oral every 6 hours PRN Temp greater or equal to 38.5C (101.3F), Mild Pain (1 - 3)  aluminum hydroxide/magnesium hydroxide/simethicone Suspension 30 milliLiter(s) Oral every 4 hours PRN Dyspepsia  benzonatate 100 milliGRAM(s) Oral three times a day PRN Cough  guaifenesin/dextromethorphan  Syrup 10 milliLiter(s) Oral every 6 hours PRN Cough  ondansetron Injectable 4 milliGRAM(s) IV Push every 6 hours PRN Nausea

## 2021-01-23 NOTE — PROGRESS NOTE ADULT - ASSESSMENT
1. Acute hypoxic resp failure   Multifocal pneumonia due to COVID-19 infection  O2 support  Started IV decadron today  Dr Bernal consult appreciated  IV remdesivir started today  Supportive care  Start albuterol neb    2. H/O recent prostatis  On po ceftin as an outpatient  Will continue for ne more week    46 y/o M with PMHx of IBS, BPH, polyp of colon, hemorrhoids, panic disorder, anxiety, and s/p hernia repair presents to the ED sent by PCP for eval of +fever and +chills dx COVID 1/18. Reports 2 weeks ago went to see urology for difficulty urinating and started on Cefuroxime for possible prostatitis, now resolved.     # COVID PNA  #hx of prostatitis      #COVID PNA/Acute hypoxic resp failure   -CXR- Bilateral infiltrates   -d dimer-220  -O2 support-currently on NC 2L saturating 90%  -continue Remdesivir-day 2  -continue Dexamethasone and lovenox  -appreciated ID recs  -monitor renal fx and lft  -monitor resp status    # H/O recent prostatis  -On po ceftin as an outpatient-to complete total of 14 days  -Will continue for one more week    #PPX  -on lovenox    Code status: Full code    Discussed plan of care with patient

## 2021-01-24 LAB
ALBUMIN SERPL ELPH-MCNC: 3 G/DL — LOW (ref 3.3–5)
ALBUMIN SERPL ELPH-MCNC: 3.1 G/DL — LOW (ref 3.3–5)
ALP SERPL-CCNC: 61 U/L — SIGNIFICANT CHANGE UP (ref 40–120)
ALP SERPL-CCNC: 64 U/L — SIGNIFICANT CHANGE UP (ref 40–120)
ALT FLD-CCNC: 38 U/L — SIGNIFICANT CHANGE UP (ref 12–78)
ALT FLD-CCNC: 39 U/L — SIGNIFICANT CHANGE UP (ref 12–78)
ANION GAP SERPL CALC-SCNC: 7 MMOL/L — SIGNIFICANT CHANGE UP (ref 5–17)
AST SERPL-CCNC: 58 U/L — HIGH (ref 15–37)
AST SERPL-CCNC: 59 U/L — HIGH (ref 15–37)
BILIRUB DIRECT SERPL-MCNC: 0.1 MG/DL — SIGNIFICANT CHANGE UP (ref 0–0.2)
BILIRUB INDIRECT FLD-MCNC: 0.3 MG/DL — SIGNIFICANT CHANGE UP (ref 0.2–1)
BILIRUB SERPL-MCNC: 0.4 MG/DL — SIGNIFICANT CHANGE UP (ref 0.2–1.2)
BILIRUB SERPL-MCNC: 0.4 MG/DL — SIGNIFICANT CHANGE UP (ref 0.2–1.2)
BUN SERPL-MCNC: 15 MG/DL — SIGNIFICANT CHANGE UP (ref 7–23)
CALCIUM SERPL-MCNC: 8.3 MG/DL — LOW (ref 8.5–10.1)
CHLORIDE SERPL-SCNC: 105 MMOL/L — SIGNIFICANT CHANGE UP (ref 96–108)
CO2 SERPL-SCNC: 27 MMOL/L — SIGNIFICANT CHANGE UP (ref 22–31)
CREAT SERPL-MCNC: 0.83 MG/DL — SIGNIFICANT CHANGE UP (ref 0.5–1.3)
GLUCOSE SERPL-MCNC: 138 MG/DL — HIGH (ref 70–99)
HCT VFR BLD CALC: 40.3 % — SIGNIFICANT CHANGE UP (ref 39–50)
HGB BLD-MCNC: 13.6 G/DL — SIGNIFICANT CHANGE UP (ref 13–17)
MCHC RBC-ENTMCNC: 30.8 PG — SIGNIFICANT CHANGE UP (ref 27–34)
MCHC RBC-ENTMCNC: 33.7 GM/DL — SIGNIFICANT CHANGE UP (ref 32–36)
MCV RBC AUTO: 91.2 FL — SIGNIFICANT CHANGE UP (ref 80–100)
PLATELET # BLD AUTO: 143 K/UL — LOW (ref 150–400)
POTASSIUM SERPL-MCNC: 3.8 MMOL/L — SIGNIFICANT CHANGE UP (ref 3.5–5.3)
POTASSIUM SERPL-SCNC: 3.8 MMOL/L — SIGNIFICANT CHANGE UP (ref 3.5–5.3)
PROT SERPL-MCNC: 6.7 GM/DL — SIGNIFICANT CHANGE UP (ref 6–8.3)
PROT SERPL-MCNC: 6.8 GM/DL — SIGNIFICANT CHANGE UP (ref 6–8.3)
RBC # BLD: 4.42 M/UL — SIGNIFICANT CHANGE UP (ref 4.2–5.8)
RBC # FLD: 11.6 % — SIGNIFICANT CHANGE UP (ref 10.3–14.5)
SODIUM SERPL-SCNC: 139 MMOL/L — SIGNIFICANT CHANGE UP (ref 135–145)
WBC # BLD: 3.25 K/UL — LOW (ref 3.8–10.5)
WBC # FLD AUTO: 3.25 K/UL — LOW (ref 3.8–10.5)

## 2021-01-24 PROCEDURE — 99232 SBSQ HOSP IP/OBS MODERATE 35: CPT

## 2021-01-24 RX ADMIN — ALBUTEROL 2 PUFF(S): 90 AEROSOL, METERED ORAL at 21:03

## 2021-01-24 RX ADMIN — Medication 650 MILLIGRAM(S): at 08:21

## 2021-01-24 RX ADMIN — REMDESIVIR 540 MILLIGRAM(S): 5 INJECTION INTRAVENOUS at 15:01

## 2021-01-24 RX ADMIN — Medication 6 MILLIGRAM(S): at 09:56

## 2021-01-24 RX ADMIN — ALBUTEROL 2 PUFF(S): 90 AEROSOL, METERED ORAL at 08:54

## 2021-01-24 RX ADMIN — ALBUTEROL 2 PUFF(S): 90 AEROSOL, METERED ORAL at 14:46

## 2021-01-24 RX ADMIN — Medication 500 MILLIGRAM(S): at 09:56

## 2021-01-24 RX ADMIN — Medication 500 MILLIGRAM(S): at 20:46

## 2021-01-24 RX ADMIN — ENOXAPARIN SODIUM 40 MILLIGRAM(S): 100 INJECTION SUBCUTANEOUS at 09:56

## 2021-01-24 RX ADMIN — Medication 650 MILLIGRAM(S): at 17:36

## 2021-01-24 RX ADMIN — ALBUTEROL 2 PUFF(S): 90 AEROSOL, METERED ORAL at 01:34

## 2021-01-24 NOTE — CHART NOTE - NSCHARTNOTEFT_GEN_A_CORE
HOME O2 EVALUATION    Pulse Ox Room Air Rest: 92%    Pulse Ox Room Air Ambulatin%    Pulse Ox on O2         3 L Ambulatin%    Pulse Ox Post Ambulation: 92%

## 2021-01-24 NOTE — PROGRESS NOTE ADULT - SUBJECTIVE AND OBJECTIVE BOX
46 y/o M with PMHx of IBS, BPH, polyp of colon, hemorrhoids, panic disorder, anxiety, and s/p hernia repair presents to the ED sent by PCP for eval of +fever and +chills. Reports 2 weeks ago went to see urology for difficulty urinating and started on Cefuroxime for possible prostatitis, now resolved. Then on  was told he is COVID+ as well. Has been taking Tylenol and Motrin for fever, Tmax 103 F. Denies cough or chest pain. NKDA. PCP: Dr. Chalo Malave. Offer to dc the pt but he is v concerned about the WBC after ED doc told him he needs to stay and received IV abx; I explained that is not indicated. Pt has v poor appetite and has not have a meal in 3 days;    -pt was admitted due to COVID PNA  CXR- Bilateral infiltrates   d dimer-220    -pt seen and examined. Walking inside the room on NC 2L. State breathing slightly better today. Denies any fevers, chills, chest pain or dizziness.  -Doing well, walking. States that breathing is better. But desat to 88% on RA ambulating.    Pulse Ox Room Air Rest: 92%    Pulse Ox Room Air Ambulatin%    Pulse Ox on O2         3 L Ambulatin%    Pulse Ox Post Ambulation: 92%.      Review of Systems:   CONSTITUTIONAL: No fever. No Weakness  EYES: No eye pain or discharge.  ENMT:  No sinus or throat pain  NECK: No pain or stiffness  RESPIRATORY: No  wheezing, chills or hemoptysis; +mild shortness of breath  CARDIOVASCULAR: No chest pain, palpitations, dizziness, or leg swelling  GASTROINTESTINAL: No abdominal or epigastric pain. No nausea, vomiting, or hematemesis; No diarrhea or constipation. No melena or hematochezia.  GENITOURINARY: No dysuria or incontinence  NEUROLOGICAL: No headaches, memory loss, loss of strength, numbness, or tremors  SKIN: No rashes.  MUSCULOSKELETAL: No joint pain or swelling; No muscle, back, or extremity pain  PSYCHIATRIC: No depression, anxiety, mood swings, or difficulty sleeping    Vital Signs Last 24 Hrs  T(C): 36.8 (2021 08:34), Max: 36.8 (2021 23:44)  T(F): 98.2 (2021 08:34), Max: 98.2 (2021 23:44)  HR: 84 (2021 08:54) (75 - 86)  BP: 100/58 (2021 08:34) (100/58 - 110/62)  BP(mean): --  RR: 18 (2021 08:34) (18 - 18)  SpO2: 96% (2021 08:54) (92% - 96%)    PHYSICAL EXAM:    GENERAL: NAD, well-groomed, well-developed  HEAD:  Atraumatic, Normocephalic  EYES: EOMI, PERRLA, conjunctiva and sclera clear  HEENT: Moist mucous membranes  NECK: Supple, No JVD  CHEST/LUNG: Clear to auscultation bilaterally; No rales, rhonchi, wheezing, or rubs  HEART: Regular rate and rhythm; No murmurs, rubs, or gallops  ABDOMEN: Soft, Nontender, Nondistended; Bowel sounds present  GENITOURINARY: Voiding, no palpable bladder  EXTREMITIES:  2+ Peripheral Pulses, No clubbing, cyanosis, or edema  MUSCULOSKELTAL- No muscle tenderness, Muscle tone normal, No joint tenderness, no Joint swelling, Joint range of motion-normal  SKIN- no rash or ulcer  NERVOUS SYSTEM:  Awake and alert, oriented x3, CNII-XII intact;  no focal deficits                          13.6   3.25  )-----------( 143      ( 2021 09:13 )             40.3                           13.5   3.76  )-----------( 135      ( 2021 10:35 )      139  |  105  |  15  ----------------------------<  138<H>  3.8   |  27  |  0.83    Ca    8.3<L>      2021 09:13    TPro  6.7  /  Alb  3.0<L>  /  TBili  0.4  /  DBili  0.1  /  AST  58<H>  /  ALT  38  /  AlkPhos  64    MEDICATIONS  (STANDING):  ALBUTerol    90 MICROgram(s) HFA Inhaler 2 Puff(s) Inhalation every 6 hours  cefuroxime   Tablet 500 milliGRAM(s) Oral every 12 hours  dexAMETHasone  Injectable 6 milliGRAM(s) IV Push daily  enoxaparin Injectable 40 milliGRAM(s) SubCutaneous daily  remdesivir  IVPB   IV Intermittent   remdesivir  IVPB 100 milliGRAM(s) IV Intermittent every 24 hours    MEDICATIONS  (PRN):  acetaminophen   Tablet .. 650 milliGRAM(s) Oral every 6 hours PRN Temp greater or equal to 38.5C (101.3F), Mild Pain (1 - 3)  aluminum hydroxide/magnesium hydroxide/simethicone Suspension 30 milliLiter(s) Oral every 4 hours PRN Dyspepsia  benzonatate 100 milliGRAM(s) Oral three times a day PRN Cough  guaifenesin/dextromethorphan  Syrup 10 milliLiter(s) Oral every 6 hours PRN Cough  ondansetron Injectable 4 milliGRAM(s) IV Push every 6 hours PRN Nausea

## 2021-01-24 NOTE — PROGRESS NOTE ADULT - ASSESSMENT
46 y/o M with PMHx of IBS, BPH, polyp of colon, hemorrhoids, panic disorder, anxiety, and s/p hernia repair presents to the ED sent by PCP for eval of +fever and +chills dx COVID 1/18. Reports 2 weeks ago went to see urology for difficulty urinating and started on Cefuroxime for possible prostatitis.   On ED eval, WBC 1.96, had temp - 102.4F, .   #Clinically significant for Sepsis present on admission associated with COVID-19.      # COVID PNA  #hx of prostatitis      #COVID PNA/Acute hypoxic resp failure   -CXR- Bilateral infiltrates   -d dimer-220  -O2 support-currently on NC 2L saturating 90%-> with desat to 88 RA ambulating, likely will need home o2  -continue Remdesivir-day 3  -continue Dexamethasone and lovenox  -appreciated ID recs  -monitor renal fx and lft  -monitor resp status    # H/O recent prostatis  -asymptomatic  -On po ceftin as an outpatient-to complete total of 14 days  -Will continue for one more week    #PPX  -on lovenox    Code status: Full code  Dispo: likely will need to arrange for home o2    Discussed plan of care with patient

## 2021-01-24 NOTE — CDI QUERY NOTE - NSCDINOTEPOA_GEN_ALL_CORE_FT
Was the condition present on admission? If so, please document in the chart that “(the condition) was present on admission.”
Was the condition present on admission? If so, please document in the chart that “(the condition) was present on admission.”

## 2021-01-24 NOTE — CDI QUERY NOTE - NSCDIOTHERTXTBX_GEN_ALL_CORE_HH
Documentation on chart of COVID-19 - Pneumonia - Neutropenia - Acute Respiratory Failure.    ED Provider documentation : SEPSIS – was this patient treated for sepsis? Yes.     WBC 1.96 - RBC 4.17 - HGB 12.1 - HCT 34.8 - PLTS 102    2 Liter nasal O2 - 102.4F - WBC 1.96-     NS bolus 2 liter - Maxipime IV - Remdesivir IV    H&P documentation:  HPI Objective Statement: 46 y/o M with PMHx of IBS, BPH, polyp of colon, hemorrhoids, panic disorder, anxiety, and s/p hernia repair presents to the ED sent by PCP for eval of +fever and +chills. Reports 2 weeks ago went to see urology for difficulty urinating and started on Cefuroxime for possible prostatitis, now resolved. Then on 1/18 was told he is COVID+ as well. Has been taking Tylenol and Motrin for fever, Tmax 103 F    Please clarify of the above clinical criteria is clinically significant?  A) Clinically significant for Sepsis present on admission associated with COVID-19  B) Clinically significant for  Sepsis present on admission associated with Prostatitis  C) Not clinically significant for Sepsis  D) Unable to determine clinical significance  E) Other ( Please specify)
Documentation on chart of COVID-19 - Pneumonia - Neutropenia - Acute Respiratory Failure.    ED Provider documentation : SEPSIS – was this patient treated for sepsis? Yes.     WBC 1.96 - RBC 4.17 - HGB 12.1 - HCT 34.8 - PLTS 102    2 Liter nasal O2 - 102.4F - WBC 1.96-     NS bolus 2 liter - Maxipime IV - Remdesivir IV    H&P documentation:  HPI Objective Statement: 44 y/o M with PMHx of IBS, BPH, polyp of colon, hemorrhoids, panic disorder, anxiety, and s/p hernia repair presents to the ED sent by PCP for eval of +fever and +chills. Reports 2 weeks ago went to see urology for difficulty urinating and started on Cefuroxime for possible prostatitis, now resolved. Then on 1/18 was told he is COVID+ as well. Has been taking Tylenol and Motrin for fever, Tmax 103 F    Please clarify of the above clinical criteria is clinically significant?  A) Clinically significant for Sepsis present on admission associated with COVID-19  B) Clinically significant for  Sepsis present on admission associated with Prostatitis  C) Not clinically significant for Sepsis  D) Unable to determine clinical significance  E) Other ( Please specify)

## 2021-01-25 ENCOUNTER — TRANSCRIPTION ENCOUNTER (OUTPATIENT)
Age: 46
End: 2021-01-25

## 2021-01-25 LAB
ALBUMIN SERPL ELPH-MCNC: 2.8 G/DL — LOW (ref 3.3–5)
ALP SERPL-CCNC: 52 U/L — SIGNIFICANT CHANGE UP (ref 40–120)
ALT FLD-CCNC: 63 U/L — SIGNIFICANT CHANGE UP (ref 12–78)
ANION GAP SERPL CALC-SCNC: 5 MMOL/L — SIGNIFICANT CHANGE UP (ref 5–17)
AST SERPL-CCNC: 76 U/L — HIGH (ref 15–37)
BILIRUB SERPL-MCNC: 0.4 MG/DL — SIGNIFICANT CHANGE UP (ref 0.2–1.2)
BUN SERPL-MCNC: 14 MG/DL — SIGNIFICANT CHANGE UP (ref 7–23)
CALCIUM SERPL-MCNC: 8.1 MG/DL — LOW (ref 8.5–10.1)
CHLORIDE SERPL-SCNC: 108 MMOL/L — SIGNIFICANT CHANGE UP (ref 96–108)
CO2 SERPL-SCNC: 29 MMOL/L — SIGNIFICANT CHANGE UP (ref 22–31)
CREAT SERPL-MCNC: 0.66 MG/DL — SIGNIFICANT CHANGE UP (ref 0.5–1.3)
GLUCOSE SERPL-MCNC: 120 MG/DL — HIGH (ref 70–99)
HCT VFR BLD CALC: 35.8 % — LOW (ref 39–50)
HGB BLD-MCNC: 12.2 G/DL — LOW (ref 13–17)
MCHC RBC-ENTMCNC: 31 PG — SIGNIFICANT CHANGE UP (ref 27–34)
MCHC RBC-ENTMCNC: 34.1 GM/DL — SIGNIFICANT CHANGE UP (ref 32–36)
MCV RBC AUTO: 90.9 FL — SIGNIFICANT CHANGE UP (ref 80–100)
PLATELET # BLD AUTO: 142 K/UL — LOW (ref 150–400)
POTASSIUM SERPL-MCNC: 4.4 MMOL/L — SIGNIFICANT CHANGE UP (ref 3.5–5.3)
POTASSIUM SERPL-SCNC: 4.4 MMOL/L — SIGNIFICANT CHANGE UP (ref 3.5–5.3)
PROT SERPL-MCNC: 5.8 GM/DL — LOW (ref 6–8.3)
RBC # BLD: 3.94 M/UL — LOW (ref 4.2–5.8)
RBC # FLD: 11.3 % — SIGNIFICANT CHANGE UP (ref 10.3–14.5)
SODIUM SERPL-SCNC: 142 MMOL/L — SIGNIFICANT CHANGE UP (ref 135–145)
WBC # BLD: 2.82 K/UL — LOW (ref 3.8–10.5)
WBC # FLD AUTO: 2.82 K/UL — LOW (ref 3.8–10.5)

## 2021-01-25 PROCEDURE — 99232 SBSQ HOSP IP/OBS MODERATE 35: CPT

## 2021-01-25 RX ADMIN — ALBUTEROL 2 PUFF(S): 90 AEROSOL, METERED ORAL at 09:15

## 2021-01-25 RX ADMIN — Medication 6 MILLIGRAM(S): at 10:26

## 2021-01-25 RX ADMIN — ALBUTEROL 2 PUFF(S): 90 AEROSOL, METERED ORAL at 14:56

## 2021-01-25 RX ADMIN — ENOXAPARIN SODIUM 40 MILLIGRAM(S): 100 INJECTION SUBCUTANEOUS at 10:26

## 2021-01-25 RX ADMIN — Medication 650 MILLIGRAM(S): at 10:42

## 2021-01-25 RX ADMIN — REMDESIVIR 540 MILLIGRAM(S): 5 INJECTION INTRAVENOUS at 14:08

## 2021-01-25 RX ADMIN — Medication 500 MILLIGRAM(S): at 10:26

## 2021-01-25 RX ADMIN — Medication 500 MILLIGRAM(S): at 21:22

## 2021-01-25 RX ADMIN — ALBUTEROL 2 PUFF(S): 90 AEROSOL, METERED ORAL at 19:58

## 2021-01-25 NOTE — DISCHARGE NOTE PROVIDER - NSFOLLOWUPCLINICS_GEN_ALL_ED_FT
[facility]  Pulmonary Medicine  [address] [place] [address]  Phone: [phone]  Fax: [phone]  Follow Up Time:

## 2021-01-25 NOTE — DISCHARGE NOTE PROVIDER - HOSPITAL COURSE
46 y/o M with PMHx of IBS, BPH, polyp of colon, hemorrhoids, panic disorder, anxiety, and s/p hernia repair presents to the ED sent by PCP for eval of +fever and +chills dx COVID . Reports 2 weeks ago went to see urology for difficulty urinating and started on Cefuroxime for possible prostatitis.   On ED eval, WBC 1.96, had temp - 102.4F, .     #Clinically significant for Sepsis present on admission associated with COVID-19.  #COVID PNA/Acute hypoxic resp failure   -CXR- Bilateral infiltrates   -d dimer-220  -Placed on NC 2L saturating 90%-> with desat to 88 RA ambulating, will need home o2  -started with Remdesivir day 4 today  -received Dexamethasone and lovenox  -ID was on board  -Overall breathing improved, ambulating without any respiratory difficulties. Pt expressed that he would like to go home today.    Pulse Ox Room Air Rest: 92%    Pulse Ox Room Air Ambulatin%    Pulse Ox on O2         3 L Ambulatin%    Pulse Ox Post Ambulation: 92%.    # H/O recent prostatis  -asymptomatic  -On po ceftin as an outpatient-to complete total of 14 days  -Will continue for 6 more days    Dispo: Patient is medically stable for discharge.  Follow up outpatient with PMD  Continue      Discharge ROS/PE:  Review of Systems:   CONSTITUTIONAL: No fever. No Weakness  EYES: No eye pain or discharge.  ENMT:  No sinus or throat pain  NECK: No pain or stiffness  RESPIRATORY: No  wheezing, chills or hemoptysis; +mild shortness of breath  CARDIOVASCULAR: No chest pain, palpitations, dizziness, or leg swelling  GASTROINTESTINAL: No abdominal or epigastric pain. No nausea, vomiting, or hematemesis; No diarrhea or constipation. No melena or hematochezia.  GENITOURINARY: No dysuria or incontinence  NEUROLOGICAL: No headaches, memory loss, loss of strength, numbness, or tremors  SKIN: No rashes.  MUSCULOSKELETAL: No joint pain or swelling; No muscle, back, or extremity pain  PSYCHIATRIC: No depression, anxiety, mood swings, or difficulty sleeping    PHYSICAL EXAM:  Vital Signs Last 24 Hrs  T(C): 36.5 (2021 08:37), Max: 36.8 (2021 15:51)  T(F): 97.7 (2021 08:37), Max: 98.3 (2021 15:51)  HR: 76 (2021 08:37) (71 - 92)  BP: 109/64 (2021 08:37) (109/64 - 124/80)  BP(mean): --  RR: 18 (2021 08:37) (18 - 18)  SpO2: 97% (2021 08:37) (93% - 97%)    GENERAL: NAD, well-groomed, well-developed  HEAD:  Atraumatic, Normocephalic  EYES: EOMI, PERRLA, conjunctiva and sclera clear  HEENT: Moist mucous membranes  NECK: Supple, No JVD  CHEST/LUNG: Clear to auscultation bilaterally; No rales, rhonchi, wheezing, or rubs  HEART: Regular rate and rhythm; No murmurs, rubs, or gallops  ABDOMEN: Soft, Nontender, Nondistended; Bowel sounds present  GENITOURINARY: Voiding, no palpable bladder  EXTREMITIES:  2+ Peripheral Pulses, No clubbing, cyanosis, or edema  MUSCULOSKELTAL- No muscle tenderness, Muscle tone normal, No joint tenderness, no Joint swelling, Joint range of motion-normal  SKIN- no rash or ulcer  NERVOUS SYSTEM:  Awake and alert, oriented x3, CNII-XII intact;  no focal deficits    46 y/o M with PMHx of IBS, BPH, polyp of colon, hemorrhoids, panic disorder, anxiety, and s/p hernia repair presents to the ED sent by PCP for eval of +fever and +chills dx COVID . Reports 2 weeks ago went to see urology for difficulty urinating and started on Cefuroxime for possible prostatitis.   On ED eval, WBC 1.96, had temp - 102.4F, .     #Clinically significant for Sepsis present on admission associated with COVID-19.  #COVID PNA/Acute hypoxic resp failure   -CXR- Bilateral infiltrates   -d dimer-220  -Placed on NC 2L saturating 90%-> with desat to 88 RA ambulating, will need home o2  -received Remdesivir, Dexamethasone and lovenox  -ID was on board  -Overall breathing improved, ambulating without any respiratory difficulties.     Pulse Ox Room Air Rest: 92%    Pulse Ox Room Air Ambulatin%    Pulse Ox on O2         3 L Ambulatin%    Pulse Ox Post Ambulation: 92%.    # H/O recent prostatis  -asymptomatic  -On po ceftin as an outpatient-to complete total of 14 days until     Dispo: Patient is medically stable for discharge to home with home o2  monitor pulse ox at home   Follow up outpatient with PMD or Pulmonary  Continue home meds and ceftin until       Discharge ROS/PE:  Review of Systems:   CONSTITUTIONAL: No fever. No Weakness  EYES: No eye pain or discharge.  ENMT:  No sinus or throat pain  NECK: No pain or stiffness  RESPIRATORY: No  wheezing, chills or hemoptysis; +mild shortness of breath  CARDIOVASCULAR: No chest pain, palpitations, dizziness, or leg swelling  GASTROINTESTINAL: No abdominal or epigastric pain. No nausea, vomiting, or hematemesis; No diarrhea or constipation. No melena or hematochezia.  GENITOURINARY: No dysuria or incontinence  NEUROLOGICAL: No headaches, memory loss, loss of strength, numbness, or tremors  SKIN: No rashes.  MUSCULOSKELETAL: No joint pain or swelling; No muscle, back, or extremity pain  PSYCHIATRIC: No depression, anxiety, mood swings, or difficulty sleeping    PHYSICAL EXAM:  Vital Signs Last 24 Hrs  T(C): 36.5 (2021 08:37), Max: 36.8 (2021 15:51)  T(F): 97.7 (2021 08:37), Max: 98.3 (2021 15:51)  HR: 76 (2021 08:37) (71 - 92)  BP: 109/64 (2021 08:37) (109/64 - 124/80)  BP(mean): --  RR: 18 (2021 08:37) (18 - 18)  SpO2: 97% (2021 08:37) (93% - 97%)    GENERAL: NAD, well-groomed, well-developed  HEAD:  Atraumatic, Normocephalic  EYES: EOMI, PERRLA, conjunctiva and sclera clear  HEENT: Moist mucous membranes  NECK: Supple, No JVD  CHEST/LUNG: Clear to auscultation bilaterally; No rales, rhonchi, wheezing, or rubs  HEART: Regular rate and rhythm; No murmurs, rubs, or gallops  ABDOMEN: Soft, Nontender, Nondistended; Bowel sounds present  GENITOURINARY: Voiding, no palpable bladder  EXTREMITIES:  2+ Peripheral Pulses, No clubbing, cyanosis, or edema  MUSCULOSKELTAL- No muscle tenderness, Muscle tone normal, No joint tenderness, no Joint swelling, Joint range of motion-normal  SKIN- no rash or ulcer  NERVOUS SYSTEM:  Awake and alert, oriented x3, CNII-XII intact;  no focal deficits

## 2021-01-25 NOTE — DISCHARGE NOTE PROVIDER - NSDCCPCAREPLAN_GEN_ALL_CORE_FT
PRINCIPAL DISCHARGE DIAGNOSIS  Diagnosis: Pneumonia due to COVID-19 virus  Assessment and Plan of Treatment: received steroid, Anticoagulation and remdesivir  to go home with home oxygen  follow up outpatient with PMD and pulmonary      SECONDARY DISCHARGE DIAGNOSES  Diagnosis: Febrile neutropenia  Assessment and Plan of Treatment: stable  follow up with PMD

## 2021-01-25 NOTE — DISCHARGE NOTE NURSING/CASE MANAGEMENT/SOCIAL WORK - PATIENT PORTAL LINK FT
You can access the FollowMyHealth Patient Portal offered by [facility] by registering at the following website: [email or web address]. By joining [facility]’s FollowMyHealth portal, you will also be able to view your health information using other applications (apps) compatible with our system.

## 2021-01-25 NOTE — PROGRESS NOTE ADULT - NUTRITIONAL ASSESSMENT
This patient has been assessed with a concern for Malnutrition and has been determined to have a diagnosis/diagnoses of Moderate protein-calorie malnutrition.    This patient is being managed with:   Diet Regular-  Entered: Jan 21 2021  3:34PM    

## 2021-01-25 NOTE — DISCHARGE NOTE PROVIDER - NSDCMRMEDTOKEN_GEN_ALL_CORE_FT
cefuroxime 500 mg oral tablet: 1 tab(s) orally every 12 hours  *****Course Not Completed***  nitrofurantoin macrocrystals 100 mg oral capsule: 1 cap(s) orally 2 times a day  ****Couse Completed***   cefuroxime 500 mg oral tablet: 1 tab(s) orally every 12 hours  *****Course Not Completed***  dexamethasone 6 mg oral tablet: 1 tab(s) orally once a day   nitrofurantoin macrocrystals 100 mg oral capsule: 1 cap(s) orally 2 times a day  ****Couse Completed***   cefuroxime 500 mg oral tablet: 1 tab(s) orally every 12 hours  *****Course Not Completed***  dexamethasone 6 mg oral tablet: 1 tab(s) orally once a day

## 2021-01-25 NOTE — DISCHARGE NOTE PROVIDER - NSDCFUSCHEDAPPT_GEN_ALL_CORE_FT
AURA MARTINEZ ; 01/29/2021 ; [facility] [address] AURA Ross ; 01/29/2021 ; [facility] [address] [place]

## 2021-01-25 NOTE — PROGRESS NOTE ADULT - ASSESSMENT
44 y/o M with PMHx of IBS, BPH, polyp of colon, hemorrhoids, panic disorder, anxiety, and s/p hernia repair presents to the ED sent by PCP for eval of +fever and +chills dx COVID . Reports 2 weeks ago went to see urology for difficulty urinating and started on Cefuroxime for possible prostatitis.   On ED eval, WBC 1.96, had temp - 102.4F, .   #Clinically significant for Sepsis present on admission associated with COVID-19.      # COVID PNA  #hx of prostatitis      #COVID PNA/Acute hypoxic resp failure   -CXR- Bilateral infiltrates   -d dimer-220  -O2 support-currently on NC 2L saturating 90%-> with desat to 88 RA ambulating, will arrange for home o2  -continue Remdesivir-day 4  -continue Dexamethasone and lovenox  -appreciated ID recs  -monitor renal fx and lft  -monitor resp status  Pulse Ox Room Air Rest: 92%  Pulse Ox Room Air Ambulatin%  Pulse Ox on O2 3 L Ambulatin%      # H/O recent prostatitis  -asymptomatic  -On po ceftin as an outpatient-to complete total of 14 days  -Will continue for one more week    #PPX  -on lovenox    Code status: Full code  Dispo: -to complete Remdesivir leanna and anticipate DC tomorrow with home o2    Discussed plan of care with patient and  for arranging home 02  46 y/o M with PMHx of IBS, BPH, polyp of colon, hemorrhoids, panic disorder, anxiety, and s/p hernia repair presents to the ED sent by PCP for eval of +fever and +chills dx COVID . Reports 2 weeks ago went to see urology for difficulty urinating and started on Cefuroxime for possible prostatitis.   On ED eval, WBC 1.96, had temp - 102.4F, .   #Clinically significant for Sepsis present on admission associated with COVID-19.      # COVID PNA  #hx of prostatitis      #COVID PNA/Acute hypoxic resp failure   -CXR- Bilateral infiltrates   -d dimer-220  -O2 support-currently on NC 2L saturating 90%-> with desat to 88 RA ambulating, will arrange for home o2  -continue Remdesivir-day 4  -continue Dexamethasone and lovenox  -appreciated ID recs  -monitor renal fx and lft  -monitor resp status  Pulse Ox Room Air Rest: 92%  Pulse Ox Room Air Ambulatin%  Pulse Ox on O2 3 L Ambulatin%      # H/O recent prostatitis  -asymptomatic  -On po ceftin as an outpatient-to complete total of 14 days  -to complete until     #PPX  -on lovenox    Code status: Full code  Dispo: -to complete Remdesivir leanna and anticipate DC tomorrow with home o2    Discussed plan of care with patient and  for arranging home   44 y/o M with PMHx of IBS, BPH, polyp of colon, hemorrhoids, panic disorder, anxiety, and s/p hernia repair presents to the ED sent by PCP for eval of +fever and +chills dx COVID . Reports 2 weeks ago went to see urology for difficulty urinating and started on Cefuroxime for possible prostatitis.   On ED eval, WBC 1.96, had temp - 102.4F, .   #Clinically significant for Sepsis present on admission associated with COVID-19.      # COVID PNA  #hx of prostatitis      #COVID PNA/Acute hypoxic resp failure   -CXR- Bilateral infiltrates   -d dimer-220  -O2 support-currently on NC 3L saturating 97%-> with desat to 88 RA ambulating, will arrange for home o2  -continue Remdesivir-day 4  -continue Dexamethasone and lovenox  -appreciated ID recs  -monitor renal fx and lft  -monitor resp status  Pulse Ox Room Air Rest: 92%  Pulse Ox Room Air Ambulatin%  Pulse Ox on O2 3 L Ambulatin%      # H/O recent prostatitis  -asymptomatic  -On po ceftin as an outpatient-to complete total of 14 days  -to complete until     #PPX  -on lovenox    Code status: Full code  Dispo: -to complete Remdesivir leanna and anticipate DC tomorrow with home o2    Discussed plan of care with patient and  for arranging home

## 2021-01-25 NOTE — PROGRESS NOTE ADULT - SUBJECTIVE AND OBJECTIVE BOX
Date of service: 21 @ 11:55    Lying in bed in NAD  SOB is improving  Has dry cough    ROS: no fever or chills; denies dizziness, no HA, no abdominal pain, no diarrhea or constipation; no dysuria, no legs pain, no rashes    MEDICATIONS  (STANDING):  ALBUTerol    90 MICROgram(s) HFA Inhaler 2 Puff(s) Inhalation every 6 hours  cefuroxime   Tablet 500 milliGRAM(s) Oral every 12 hours  dexAMETHasone  Injectable 6 milliGRAM(s) IV Push daily  enoxaparin Injectable 40 milliGRAM(s) SubCutaneous daily  remdesivir  IVPB   IV Intermittent   remdesivir  IVPB 100 milliGRAM(s) IV Intermittent every 24 hours    Vital Signs Last 24 Hrs  T(C): 36.5 (2021 08:37), Max: 36.8 (2021 15:51)  T(F): 97.7 (2021 08:37), Max: 98.3 (2021 15:51)  HR: 76 (2021 08:37) (71 - 92)  BP: 109/64 (2021 08:37) (109/64 - 124/80)  BP(mean): --  RR: 18 (2021 08:37) (18 - 18)  SpO2: 97% (2021 08:37) (93% - 97%)     Physical exam:    Constitutional:  No acute distress  HEENT: NC/AT, EOMI, PERRLA, conjunctivae clear  Neck: supple; thyroid not palpable  Back: no tenderness  Respiratory: respiratory effort normal; few crackles at bases  Cardiovascular: S1S2 regular, no murmurs  Abdomen: soft, not tender, not distended, positive BS  Genitourinary: no suprapubic tenderness  Lymphatic: no LN palpable  Musculoskeletal: no muscle tenderness, no joint swelling or tenderness  Extremities: no pedal edema  Neurological/ Psychiatric: AxOx3, moving all extremities  Skin: no rashes; no palpable lesions    Labs: reviewed                        12.2   2.82  )-----------( 142      ( 2021 07:15 )             35.8         142  |  108  |  14  ----------------------------<  120<H>  4.4   |  29  |  0.66    Ca    8.1<L>      2021 07:15    TPro  5.8<L>  /  Alb  2.8<L>  /  TBili  0.4  /  DBili  x   /  AST  76<H>  /  ALT  63  /  AlkPhos  52          Ferritin, Serum: 2183 ng/mL (21 @ 10:35)  D-Dimer Assay, Quantitative: 220 ng/mL DDU (21 @ 10:35)  C-Reactive Protein, Serum: 3.01 mg/dL (21 @ 10:35)                        12.1   2.19  )-----------( 102      ( 2021 10:15 )             34.8         137  |  105  |  9   ----------------------------<  97  3.9   |  28  |  0.86    Ca    7.4<L>      2021 12:51         Urinalysis Basic - ( 2021 12:51 )    Color: Yellow / Appearance: Clear / S.015 / pH: x  Gluc: x / Ketone: Trace  / Bili: Negative / Urobili: Negative mg/dL   Blood: x / Protein: 30 mg/dL / Nitrite: Negative   Leuk Esterase: Trace / RBC: Negative /HPF / WBC 0-2   Sq Epi: x / Non Sq Epi: Few / Bacteria: Occasional    Outpatient COVID-10 PCR test on  detected    Radiology: all available radiological tests reviewed    < from: Xray Chest 1 View-PORTABLE IMMEDIATE (Xray Chest 1 View-PORTABLE IMMEDIATE .) (21 @ 13:29) >  Mid lateral lung field infiltrates are consistent with Covid pneumonia.    < end of copied text >      Advanced directives addressed: full resuscitation

## 2021-01-25 NOTE — PROGRESS NOTE ADULT - SUBJECTIVE AND OBJECTIVE BOX
46 y/o M with PMHx of IBS, BPH, polyp of colon, hemorrhoids, panic disorder, anxiety, and s/p hernia repair presents to the ED sent by PCP for eval of +fever and +chills. Reports 2 weeks ago went to see urology for difficulty urinating and started on Cefuroxime for possible prostatitis, now resolved. Then on 1/18 was told he is COVID+ as well. Has been taking Tylenol and Motrin for fever, Tmax 103 F. Denies cough or chest pain. NKDA. PCP: Dr. Chalo Malave. Offer to dc the pt but he is v concerned about the WBC after ED doc told him he needs to stay and received IV abx; I explained that is not indicated. Pt has v poor appetite and has not have a meal in 3 days;    -pt was admitted due to COVID PNA  CXR- Bilateral infiltrates   d dimer-220    1/23-pt seen and examined. Walking inside the room on NC 2L. State breathing slightly better today. Denies any fevers, chills, chest pain or dizziness.  1/24-Doing well, walking. States that breathing is better. But desat to 88% on RA ambulating.    1/25-pt seen and examined. States that he feels much better, eating well. Denies any sob, fevers or chills. On NC 3L saturating 97%.  Expressed that he would like to go home today or tomorrow.    Review of Systems:   CONSTITUTIONAL: No fever. No Weakness  EYES: No eye pain or discharge.  ENMT:  No sinus or throat pain  NECK: No pain or stiffness  RESPIRATORY: No  wheezing, chills or hemoptysis; no shortness of breath  CARDIOVASCULAR: No chest pain, palpitations, dizziness, or leg swelling  GASTROINTESTINAL: No abdominal or epigastric pain. No nausea, vomiting, or hematemesis; No diarrhea or constipation. No melena or hematochezia.  GENITOURINARY: No dysuria or incontinence  NEUROLOGICAL: No headaches, memory loss, loss of strength, numbness, or tremors  SKIN: No rashes.  MUSCULOSKELETAL: No joint pain or swelling; No muscle, back, or extremity pain  PSYCHIATRIC: No depression, anxiety, mood swings, or difficulty sleeping    Vital Signs Last 24 Hrs  T(C): 36.5 (25 Jan 2021 08:37), Max: 36.8 (24 Jan 2021 15:51)  T(F): 97.7 (25 Jan 2021 08:37), Max: 98.3 (24 Jan 2021 15:51)  HR: 76 (25 Jan 2021 08:37) (71 - 92)  BP: 109/64 (25 Jan 2021 08:37) (109/64 - 124/80)  BP(mean): --  RR: 24 (25 Jan 2021 12:41) (18 - 24)  SpO2: 84% (25 Jan 2021 12:41) (84% - 97%)    PHYSICAL EXAM:    GENERAL: NAD, well-groomed, well-developed  HEAD:  Atraumatic, Normocephalic  EYES: EOMI, PERRLA, conjunctiva and sclera clear  HEENT: Moist mucous membranes  NECK: Supple, No JVD  CHEST/LUNG: Clear to auscultation bilaterally; No rales, rhonchi, wheezing, or rubs  HEART: Regular rate and rhythm; No murmurs, rubs, or gallops  ABDOMEN: Soft, Nontender, Nondistended; Bowel sounds present  GENITOURINARY: Voiding, no palpable bladder  EXTREMITIES:  2+ Peripheral Pulses, No clubbing, cyanosis, or edema  MUSCULOSKELTAL- No muscle tenderness, Muscle tone normal, No joint tenderness, no Joint swelling, Joint range of motion-normal  SKIN- no rash or ulcer  NERVOUS SYSTEM:  Awake and alert, oriented x3, CNII-XII intact;  no focal deficits                          12.2   2.82  )-----------( 142      ( 25 Jan 2021 07:15 )             35.8   01-25    142  |  108  |  14  ----------------------------<  120<H>  4.4   |  29  |  0.66    Ca    8.1<L>      25 Jan 2021 07:15    TPro  5.8<L>  /  Alb  2.8<L>  /  TBili  0.4  /  DBili  x   /  AST  76<H>  /  ALT  63  /  AlkPhos  52  01-25  MEDICATIONS  (STANDING):  ALBUTerol    90 MICROgram(s) HFA Inhaler 2 Puff(s) Inhalation every 6 hours  cefuroxime   Tablet 500 milliGRAM(s) Oral every 12 hours  dexAMETHasone  Injectable 6 milliGRAM(s) IV Push daily  enoxaparin Injectable 40 milliGRAM(s) SubCutaneous daily  remdesivir  IVPB   IV Intermittent   remdesivir  IVPB 100 milliGRAM(s) IV Intermittent every 24 hours    MEDICATIONS  (PRN):  acetaminophen   Tablet .. 650 milliGRAM(s) Oral every 6 hours PRN Temp greater or equal to 38.5C (101.3F), Mild Pain (1 - 3)  aluminum hydroxide/magnesium hydroxide/simethicone Suspension 30 milliLiter(s) Oral every 4 hours PRN Dyspepsia  benzonatate 100 milliGRAM(s) Oral three times a day PRN Cough  guaifenesin/dextromethorphan  Syrup 10 milliLiter(s) Oral every 6 hours PRN Cough  ondansetron Injectable 4 milliGRAM(s) IV Push every 6 hours PRN Nausea

## 2021-01-25 NOTE — PROGRESS NOTE ADULT - ASSESSMENT
44 y/o Male with h/o IBS, BPH, polyp of colon, hemorrhoids, panic disorder, anxiety, hernia repair was admitted on 1/21 for fever and chills. Reports 2 weeks ago went to see urology for difficulty urinating and started on cefuroxime for possible prostatitis. Dysuria has resolved. Then on 1/18 was told he is COVID+. Has been taking Tylenol and Motrin for fever, Tmax 103 F. Denies cough or chest pain. Pt has poor appetite and has not have a meal in 3 days.    1. Febrile syndrome improving. COVID-19 viral syndrome. Multifocal pneumonia.  -no further fever  -obtain inflammatory markers  -hypoxia is improved  -CXR showing infiltrates  -on steroids  -on AC  -start remdesivir protocol # 4  -tolerating abx well so far; no side effects noted  -monitor respiratory status  -continue remdesivir for one more day  -droplet isolation  -monitor temps  -f/u CBC  -supportive care  2. Other issues:   -care per medicine

## 2021-01-26 VITALS — OXYGEN SATURATION: 95 %

## 2021-01-26 LAB
ALBUMIN SERPL ELPH-MCNC: 2.6 G/DL — LOW (ref 3.3–5)
ALP SERPL-CCNC: 53 U/L — SIGNIFICANT CHANGE UP (ref 40–120)
ALT FLD-CCNC: 93 U/L — HIGH (ref 12–78)
ANION GAP SERPL CALC-SCNC: 6 MMOL/L — SIGNIFICANT CHANGE UP (ref 5–17)
AST SERPL-CCNC: 81 U/L — HIGH (ref 15–37)
BILIRUB SERPL-MCNC: 0.5 MG/DL — SIGNIFICANT CHANGE UP (ref 0.2–1.2)
BUN SERPL-MCNC: 14 MG/DL — SIGNIFICANT CHANGE UP (ref 7–23)
CALCIUM SERPL-MCNC: 8 MG/DL — LOW (ref 8.5–10.1)
CHLORIDE SERPL-SCNC: 109 MMOL/L — HIGH (ref 96–108)
CO2 SERPL-SCNC: 28 MMOL/L — SIGNIFICANT CHANGE UP (ref 22–31)
CREAT SERPL-MCNC: 0.64 MG/DL — SIGNIFICANT CHANGE UP (ref 0.5–1.3)
GLUCOSE SERPL-MCNC: 103 MG/DL — HIGH (ref 70–99)
HCT VFR BLD CALC: 36.6 % — LOW (ref 39–50)
HGB BLD-MCNC: 12.5 G/DL — LOW (ref 13–17)
MCHC RBC-ENTMCNC: 31.2 PG — SIGNIFICANT CHANGE UP (ref 27–34)
MCHC RBC-ENTMCNC: 34.2 GM/DL — SIGNIFICANT CHANGE UP (ref 32–36)
MCV RBC AUTO: 91.3 FL — SIGNIFICANT CHANGE UP (ref 80–100)
PLATELET # BLD AUTO: 151 K/UL — SIGNIFICANT CHANGE UP (ref 150–400)
POTASSIUM SERPL-MCNC: 4.2 MMOL/L — SIGNIFICANT CHANGE UP (ref 3.5–5.3)
POTASSIUM SERPL-SCNC: 4.2 MMOL/L — SIGNIFICANT CHANGE UP (ref 3.5–5.3)
PROT SERPL-MCNC: 5.8 GM/DL — LOW (ref 6–8.3)
RBC # BLD: 4.01 M/UL — LOW (ref 4.2–5.8)
RBC # FLD: 11.4 % — SIGNIFICANT CHANGE UP (ref 10.3–14.5)
SODIUM SERPL-SCNC: 143 MMOL/L — SIGNIFICANT CHANGE UP (ref 135–145)
WBC # BLD: 3 K/UL — LOW (ref 3.8–10.5)
WBC # FLD AUTO: 3 K/UL — LOW (ref 3.8–10.5)

## 2021-01-26 PROCEDURE — 99239 HOSP IP/OBS DSCHRG MGMT >30: CPT

## 2021-01-26 RX ORDER — DEXAMETHASONE 0.5 MG/5ML
1 ELIXIR ORAL
Qty: 5 | Refills: 0
Start: 2021-01-26 | End: 2021-01-30

## 2021-01-26 RX ADMIN — Medication 500 MILLIGRAM(S): at 09:48

## 2021-01-26 RX ADMIN — REMDESIVIR 540 MILLIGRAM(S): 5 INJECTION INTRAVENOUS at 14:09

## 2021-01-26 RX ADMIN — ALBUTEROL 2 PUFF(S): 90 AEROSOL, METERED ORAL at 11:11

## 2021-01-26 RX ADMIN — ALBUTEROL 2 PUFF(S): 90 AEROSOL, METERED ORAL at 16:36

## 2021-01-26 RX ADMIN — Medication 6 MILLIGRAM(S): at 09:48

## 2021-01-26 RX ADMIN — ENOXAPARIN SODIUM 40 MILLIGRAM(S): 100 INJECTION SUBCUTANEOUS at 09:48

## 2021-01-27 LAB
CULTURE RESULTS: SIGNIFICANT CHANGE UP
CULTURE RESULTS: SIGNIFICANT CHANGE UP
SPECIMEN SOURCE: SIGNIFICANT CHANGE UP
SPECIMEN SOURCE: SIGNIFICANT CHANGE UP

## 2021-01-28 RX ORDER — CEFUROXIME AXETIL 250 MG
1 TABLET ORAL
Qty: 0 | Refills: 0 | DISCHARGE
Start: 2021-01-28

## 2021-01-29 ENCOUNTER — APPOINTMENT (OUTPATIENT)
Dept: UROLOGY | Facility: CLINIC | Age: 46
End: 2021-01-29

## 2021-02-03 DIAGNOSIS — N40.0 BENIGN PROSTATIC HYPERPLASIA WITHOUT LOWER URINARY TRACT SYMPTOMS: ICD-10-CM

## 2021-02-03 DIAGNOSIS — F41.0 PANIC DISORDER [EPISODIC PAROXYSMAL ANXIETY]: ICD-10-CM

## 2021-02-03 DIAGNOSIS — E44.0 MODERATE PROTEIN-CALORIE MALNUTRITION: ICD-10-CM

## 2021-02-03 DIAGNOSIS — Z86.010 PERSONAL HISTORY OF COLONIC POLYPS: ICD-10-CM

## 2021-02-03 DIAGNOSIS — U07.1 COVID-19: ICD-10-CM

## 2021-02-03 DIAGNOSIS — D70.9 NEUTROPENIA, UNSPECIFIED: ICD-10-CM

## 2021-02-03 DIAGNOSIS — J96.01 ACUTE RESPIRATORY FAILURE WITH HYPOXIA: ICD-10-CM

## 2021-02-03 DIAGNOSIS — Z98.890 OTHER SPECIFIED POSTPROCEDURAL STATES: ICD-10-CM

## 2021-02-03 DIAGNOSIS — Z87.19 PERSONAL HISTORY OF OTHER DISEASES OF THE DIGESTIVE SYSTEM: ICD-10-CM

## 2021-02-03 DIAGNOSIS — F41.9 ANXIETY DISORDER, UNSPECIFIED: ICD-10-CM

## 2021-02-03 DIAGNOSIS — A41.9 SEPSIS, UNSPECIFIED ORGANISM: ICD-10-CM

## 2021-02-03 DIAGNOSIS — K64.9 UNSPECIFIED HEMORRHOIDS: ICD-10-CM

## 2021-02-03 DIAGNOSIS — N41.9 INFLAMMATORY DISEASE OF PROSTATE, UNSPECIFIED: ICD-10-CM

## 2021-02-03 DIAGNOSIS — J12.82 PNEUMONIA DUE TO CORONAVIRUS DISEASE 2019: ICD-10-CM

## 2021-03-02 PROBLEM — K64.9 UNSPECIFIED HEMORRHOIDS: Chronic | Status: ACTIVE | Noted: 2021-01-23

## 2021-03-02 PROBLEM — N40.0 BENIGN PROSTATIC HYPERPLASIA WITHOUT LOWER URINARY TRACT SYMPTOMS: Chronic | Status: ACTIVE | Noted: 2021-01-23

## 2021-03-02 PROBLEM — K63.5 POLYP OF COLON: Chronic | Status: ACTIVE | Noted: 2021-01-23

## 2021-03-02 PROBLEM — F41.9 ANXIETY DISORDER, UNSPECIFIED: Chronic | Status: ACTIVE | Noted: 2021-01-23

## 2021-03-02 PROBLEM — F41.0 PANIC DISORDER [EPISODIC PAROXYSMAL ANXIETY]: Chronic | Status: ACTIVE | Noted: 2021-01-23

## 2021-03-02 PROBLEM — K58.9 IRRITABLE BOWEL SYNDROME WITHOUT DIARRHEA: Chronic | Status: ACTIVE | Noted: 2021-01-23

## 2021-03-16 ENCOUNTER — APPOINTMENT (OUTPATIENT)
Dept: UROLOGY | Facility: CLINIC | Age: 46
End: 2021-03-16
Payer: MEDICAID

## 2021-03-16 VITALS
HEART RATE: 79 BPM | DIASTOLIC BLOOD PRESSURE: 80 MMHG | HEIGHT: 71 IN | WEIGHT: 178 LBS | BODY MASS INDEX: 24.92 KG/M2 | SYSTOLIC BLOOD PRESSURE: 122 MMHG

## 2021-03-16 DIAGNOSIS — R31.29 OTHER MICROSCOPIC HEMATURIA: ICD-10-CM

## 2021-03-16 PROCEDURE — 99072 ADDL SUPL MATRL&STAF TM PHE: CPT

## 2021-03-16 PROCEDURE — 52000 CYSTOURETHROSCOPY: CPT

## 2021-03-17 NOTE — DIETITIAN INITIAL EVALUATION ADULT. - ADD RECOMMEND
1) add gelatein TID 2) add MVI with minerals daily 3) consider checking vitamin D level and supplement prn 4) daily wt checks to track/trend changes 82

## 2021-03-30 ENCOUNTER — APPOINTMENT (OUTPATIENT)
Dept: UROLOGY | Facility: CLINIC | Age: 46
End: 2021-03-30
Payer: MEDICAID

## 2021-03-30 VITALS
OXYGEN SATURATION: 96 % | WEIGHT: 178 LBS | DIASTOLIC BLOOD PRESSURE: 61 MMHG | HEIGHT: 71 IN | SYSTOLIC BLOOD PRESSURE: 108 MMHG | HEART RATE: 91 BPM | BODY MASS INDEX: 24.92 KG/M2

## 2021-03-30 DIAGNOSIS — N13.8 BENIGN PROSTATIC HYPERPLASIA WITH LOWER URINARY TRACT SYMPMS: ICD-10-CM

## 2021-03-30 DIAGNOSIS — N40.1 BENIGN PROSTATIC HYPERPLASIA WITH LOWER URINARY TRACT SYMPMS: ICD-10-CM

## 2021-03-30 PROCEDURE — 51741 ELECTRO-UROFLOWMETRY FIRST: CPT

## 2021-03-30 PROCEDURE — 99072 ADDL SUPL MATRL&STAF TM PHE: CPT

## 2021-03-30 PROCEDURE — 76872 US TRANSRECTAL: CPT

## 2021-03-30 PROCEDURE — 76857 US EXAM PELVIC LIMITED: CPT

## 2021-04-22 ENCOUNTER — NON-APPOINTMENT (OUTPATIENT)
Age: 46
End: 2021-04-22

## 2021-04-22 NOTE — END OF VISIT
[FreeTextEntry3] : He will trial alpha blockade and a course of antibiotics was prescribed.  A CAT scan cystoscopy and prostate ultrasound be done with plans to follow

## 2021-04-22 NOTE — PHYSICAL EXAM
[General Appearance - Well Developed] : well developed [General Appearance - Well Nourished] : well nourished [Normal Appearance] : normal appearance [General Appearance - In No Acute Distress] : no acute distress [Well Groomed] : well groomed [Edema] : no peripheral edema [Respiration, Rhythm And Depth] : normal respiratory rhythm and effort [Exaggerated Use Of Accessory Muscles For Inspiration] : no accessory muscle use [Abdomen Soft] : soft [Abdomen Tenderness] : non-tender [Costovertebral Angle Tenderness] : no ~M costovertebral angle tenderness [Urethral Meatus] : meatus normal [Urinary Bladder Findings] : the bladder was normal on palpation [Scrotum] : the scrotum was normal [No Prostate Nodules] : no prostate nodules [Testes Mass (___cm)] : there were no testicular masses [Normal Station and Gait] : the gait and station were normal for the patient's age [] : no rash [No Focal Deficits] : no focal deficits [Affect] : the affect was normal [Oriented To Time, Place, And Person] : oriented to person, place, and time [Mood] : the mood was normal [Not Anxious] : not anxious [No Palpable Adenopathy] : no palpable adenopathy [FreeTextEntry1] : Prostate is relatively small and palpably benign

## 2021-04-22 NOTE — HISTORY OF PRESENT ILLNESS
[FreeTextEntry1] : This patient presents with the finding of atypical cells on a urine cytology.  He also notes urinary frequency urgency and pressure with lower abdominal pain.  He states that he had a general sonogram which was normal.

## 2021-10-17 NOTE — DIETITIAN INITIAL EVALUATION ADULT. - SIGNS/SYMPTOMS
[FreeTextEntry5] : See HPI.  [FreeTextEntry6] : See HPI.  meeting <50% of estimated nutr needs x 6 days; 1% wt loss x 1 wk

## 2023-01-03 ENCOUNTER — APPOINTMENT (OUTPATIENT)
Dept: DERMATOLOGY | Facility: CLINIC | Age: 48
End: 2023-01-03
Payer: MEDICAID

## 2023-01-03 ENCOUNTER — NON-APPOINTMENT (OUTPATIENT)
Age: 48
End: 2023-01-03

## 2023-01-03 DIAGNOSIS — D22.9 MELANOCYTIC NEVI, UNSPECIFIED: ICD-10-CM

## 2023-01-03 DIAGNOSIS — L73.9 FOLLICULAR DISORDER, UNSPECIFIED: ICD-10-CM

## 2023-01-03 DIAGNOSIS — L82.1 OTHER SEBORRHEIC KERATOSIS: ICD-10-CM

## 2023-01-03 DIAGNOSIS — Z12.83 ENCOUNTER FOR SCREENING FOR MALIGNANT NEOPLASM OF SKIN: ICD-10-CM

## 2023-01-03 PROCEDURE — 99204 OFFICE O/P NEW MOD 45 MIN: CPT

## 2023-01-03 RX ORDER — BETAMETHASONE DIPROPIONATE 0.5 MG/G
0.05 LOTION TOPICAL
Qty: 1 | Refills: 1 | Status: ACTIVE | COMMUNITY
Start: 2023-01-03 | End: 1900-01-01

## 2023-01-03 RX ORDER — TERAZOSIN 5 MG/1
5 CAPSULE ORAL
Qty: 7 | Refills: 0 | Status: DISCONTINUED | OUTPATIENT
Start: 2021-01-15 | End: 2023-01-03

## 2023-01-03 RX ORDER — RIFAXIMIN 550 MG/1
550 TABLET ORAL
Qty: 42 | Refills: 2 | Status: DISCONTINUED | COMMUNITY
Start: 2020-07-30 | End: 2023-01-03

## 2023-01-03 RX ORDER — CHOLESTYRAMINE 4 G/9G
4 POWDER, FOR SUSPENSION ORAL
Qty: 30 | Refills: 3 | Status: DISCONTINUED | COMMUNITY
Start: 2020-08-13 | End: 2023-01-03

## 2023-01-03 RX ORDER — TAMSULOSIN HYDROCHLORIDE 0.4 MG/1
0.4 CAPSULE ORAL
Qty: 7 | Refills: 0 | Status: DISCONTINUED | OUTPATIENT
Start: 2021-01-15 | End: 2023-01-03

## 2023-01-03 RX ORDER — CEFUROXIME AXETIL 500 MG/1
500 TABLET ORAL TWICE DAILY
Qty: 28 | Refills: 0 | Status: DISCONTINUED | COMMUNITY
Start: 2021-01-15 | End: 2023-01-03

## 2023-04-11 ENCOUNTER — APPOINTMENT (OUTPATIENT)
Dept: OTOLARYNGOLOGY | Facility: CLINIC | Age: 48
End: 2023-04-11
Payer: MEDICAID

## 2023-04-11 VITALS — WEIGHT: 190 LBS | HEIGHT: 71 IN | TEMPERATURE: 97.4 F | BODY MASS INDEX: 26.6 KG/M2

## 2023-04-11 DIAGNOSIS — H90.3 SENSORINEURAL HEARING LOSS, BILATERAL: ICD-10-CM

## 2023-04-11 DIAGNOSIS — H93.13 TINNITUS, BILATERAL: ICD-10-CM

## 2023-04-11 PROCEDURE — 92557 COMPREHENSIVE HEARING TEST: CPT

## 2023-04-11 PROCEDURE — 99204 OFFICE O/P NEW MOD 45 MIN: CPT

## 2023-04-11 PROCEDURE — 92567 TYMPANOMETRY: CPT

## 2023-04-11 NOTE — REVIEW OF SYSTEMS
[Seasonal Allergies] : seasonal allergies [Ear Noises] : ear noises [Negative] : Heme/Lymph [de-identified] : slightly diminished hearing,

## 2023-04-11 NOTE — PHYSICAL EXAM
[Midline] : trachea located in midline position [Normal] : no rashes [] : septum deviated to the right [Removed] : palatine tonsils previously removed

## 2023-04-11 NOTE — HISTORY OF PRESENT ILLNESS
[de-identified] : c/o problem with tinnitus.  Hx of loud music when younger - also . Problem for several years.  24/7 -no pulsating.  Problem bilat - sl worse on right.

## 2023-04-11 NOTE — ASSESSMENT
[FreeTextEntry1] : Patient with bilat tinnitus for several years.  Hx of noise exposure - exam normal - audio shows bilat hfsnhl.  Advised of relation of snhl and tinnitus and discussed sound protection as well as use of white noise at night.  DIscussed lipoflavenoid as well but optimistic about this helping ..Recommend yearly audio.

## 2024-01-05 ENCOUNTER — APPOINTMENT (OUTPATIENT)
Dept: ORTHOPEDIC SURGERY | Facility: CLINIC | Age: 49
End: 2024-01-05
Payer: COMMERCIAL

## 2024-01-05 VITALS — BODY MASS INDEX: 26.6 KG/M2 | HEIGHT: 71 IN | WEIGHT: 190 LBS

## 2024-01-05 DIAGNOSIS — S61.012A LACERATION W/OUT FOREIGN BODY OF LEFT THUMB W/OUT DAMAGE TO NAIL, INITIAL ENCOUNTER: ICD-10-CM

## 2024-01-05 DIAGNOSIS — Z78.9 OTHER SPECIFIED HEALTH STATUS: ICD-10-CM

## 2024-01-05 PROCEDURE — 99203 OFFICE O/P NEW LOW 30 MIN: CPT

## 2024-01-05 PROCEDURE — 73140 X-RAY EXAM OF FINGER(S): CPT | Mod: LT

## 2024-01-05 NOTE — PHYSICAL EXAM
[] : laceration/abrasion [Left] : left fingers [There are no fractures, subluxations or dislocations. No significant abnormalities are seen] : There are no fractures, subluxations or dislocations. No significant abnormalities are seen [FreeTextEntry3] : 2 cm laceration, dorsal radial MP joint of the thumb.  Small eschar  No fluid expressed, no fluctuance  [FreeTextEntry9] : mild degenerative changes

## 2024-01-05 NOTE — HISTORY OF PRESENT ILLNESS
[de-identified] : 48 year old male presenting with LT thumb pain. He was helping a contractor install an oven door sustained a laceration. HE was seen at [facility] for sutures which were removed at 12 days.  DOI: 11/22/23 [] : no [FreeTextEntry1] : LFT thumb [FreeTextEntry5] : LFT thumb injury from helping contractor install double oven at home 2 weeks ago. Received prior treatment and stitches the day of the incident. Removed stitches himself, wound reopened.

## 2024-01-05 NOTE — DISCUSSION/SUMMARY
[de-identified] : Discussed the nature of the diagnosis and risk and benefits of different modalities of treatment. LT thumb c-rays reviewed and discussed. No signs of infection. Reassured.  PRN

## 2024-01-09 ENCOUNTER — APPOINTMENT (OUTPATIENT)
Dept: DERMATOLOGY | Facility: CLINIC | Age: 49
End: 2024-01-09

## 2024-12-13 ENCOUNTER — APPOINTMENT (OUTPATIENT)
Dept: GASTROENTEROLOGY | Facility: CLINIC | Age: 49
End: 2024-12-13